# Patient Record
Sex: FEMALE | Race: WHITE | NOT HISPANIC OR LATINO | Employment: FULL TIME | URBAN - METROPOLITAN AREA
[De-identification: names, ages, dates, MRNs, and addresses within clinical notes are randomized per-mention and may not be internally consistent; named-entity substitution may affect disease eponyms.]

---

## 2023-01-23 PROBLEM — R19.8 TENESMUS: Status: ACTIVE | Noted: 2023-01-23

## 2023-01-23 PROBLEM — C18.7 MALIGNANT NEOPLASM OF SIGMOID COLON (MULTI): Status: ACTIVE | Noted: 2023-01-23

## 2023-01-23 PROBLEM — R10.9 ABDOMINAL CRAMPING: Status: ACTIVE | Noted: 2023-01-23

## 2023-01-23 PROBLEM — K62.5 BRBPR (BRIGHT RED BLOOD PER RECTUM): Status: ACTIVE | Noted: 2023-01-23

## 2023-01-23 PROBLEM — Z85.3 PERSONAL HISTORY OF BREAST CANCER: Status: ACTIVE | Noted: 2023-01-23

## 2023-01-23 PROBLEM — E04.1 THYROID NODULE: Status: ACTIVE | Noted: 2023-01-23

## 2023-01-23 PROBLEM — I25.10 CORONARY ARTERY DISEASE INVOLVING NATIVE CORONARY ARTERY OF NATIVE HEART WITHOUT ANGINA PECTORIS: Status: ACTIVE | Noted: 2023-01-23

## 2023-01-23 PROBLEM — K59.00 CONSTIPATION: Status: ACTIVE | Noted: 2023-01-23

## 2023-01-23 PROBLEM — N60.31: Status: ACTIVE | Noted: 2023-01-23

## 2023-01-23 PROBLEM — N64.89 POSTOPERATIVE BREAST ASYMMETRY: Status: ACTIVE | Noted: 2023-01-23

## 2023-01-23 PROBLEM — I82.A22: Status: ACTIVE | Noted: 2023-01-23

## 2023-01-23 PROBLEM — K21.9 GERD (GASTROESOPHAGEAL REFLUX DISEASE): Status: ACTIVE | Noted: 2023-01-23

## 2023-01-23 PROBLEM — R97.0 ELEVATED CEA: Status: ACTIVE | Noted: 2023-01-23

## 2023-01-23 RX ORDER — VIT C/E/ZN/COPPR/LUTEIN/ZEAXAN 250MG-90MG
CAPSULE ORAL
COMMUNITY
Start: 2022-05-23 | End: 2023-03-07 | Stop reason: SDUPTHER

## 2023-03-07 ENCOUNTER — OFFICE VISIT (OUTPATIENT)
Dept: PRIMARY CARE | Facility: CLINIC | Age: 63
End: 2023-03-07
Payer: COMMERCIAL

## 2023-03-07 VITALS
BODY MASS INDEX: 31.58 KG/M2 | DIASTOLIC BLOOD PRESSURE: 86 MMHG | SYSTOLIC BLOOD PRESSURE: 132 MMHG | HEART RATE: 69 BPM | TEMPERATURE: 97.3 F | WEIGHT: 185 LBS | RESPIRATION RATE: 16 BRPM | HEIGHT: 64 IN | OXYGEN SATURATION: 98 %

## 2023-03-07 DIAGNOSIS — E04.1 THYROID NODULE: ICD-10-CM

## 2023-03-07 DIAGNOSIS — K59.00 CONSTIPATION, UNSPECIFIED CONSTIPATION TYPE: ICD-10-CM

## 2023-03-07 DIAGNOSIS — Z13.220 LIPID SCREENING: ICD-10-CM

## 2023-03-07 DIAGNOSIS — C18.7 MALIGNANT NEOPLASM OF SIGMOID COLON (MULTI): ICD-10-CM

## 2023-03-07 DIAGNOSIS — I25.10 CORONARY ARTERY DISEASE INVOLVING NATIVE CORONARY ARTERY OF NATIVE HEART WITHOUT ANGINA PECTORIS: Primary | ICD-10-CM

## 2023-03-07 PROBLEM — I82.A22: Status: RESOLVED | Noted: 2023-01-23 | Resolved: 2023-03-07

## 2023-03-07 PROBLEM — R10.9 ABDOMINAL CRAMPING: Status: RESOLVED | Noted: 2023-01-23 | Resolved: 2023-03-07

## 2023-03-07 PROBLEM — E78.2 MIXED HYPERLIPIDEMIA: Status: ACTIVE | Noted: 2022-04-28

## 2023-03-07 PROBLEM — K21.9 GERD (GASTROESOPHAGEAL REFLUX DISEASE): Status: RESOLVED | Noted: 2023-01-23 | Resolved: 2023-03-07

## 2023-03-07 PROBLEM — R19.8 TENESMUS: Status: RESOLVED | Noted: 2023-01-23 | Resolved: 2023-03-07

## 2023-03-07 PROBLEM — K62.5 BRBPR (BRIGHT RED BLOOD PER RECTUM): Status: RESOLVED | Noted: 2023-01-23 | Resolved: 2023-03-07

## 2023-03-07 PROCEDURE — 1036F TOBACCO NON-USER: CPT | Performed by: FAMILY MEDICINE

## 2023-03-07 PROCEDURE — 99213 OFFICE O/P EST LOW 20 MIN: CPT | Performed by: FAMILY MEDICINE

## 2023-03-07 RX ORDER — CYANOCOBALAMIN (VITAMIN B-12) 500 MCG
1 TABLET ORAL DAILY
COMMUNITY

## 2023-03-07 RX ORDER — ALBUTEROL SULFATE 90 UG/1
AEROSOL, METERED RESPIRATORY (INHALATION)
COMMUNITY
End: 2023-03-07 | Stop reason: ALTCHOICE

## 2023-03-07 ASSESSMENT — ENCOUNTER SYMPTOMS
SINUS PRESSURE: 0
DYSPHORIC MOOD: 0
SLEEP DISTURBANCE: 0
DYSURIA: 0
APPETITE CHANGE: 0
NERVOUS/ANXIOUS: 0
ADENOPATHY: 0
WHEEZING: 0
LIGHT-HEADEDNESS: 0
CHILLS: 0
DIARRHEA: 0
DIFFICULTY URINATING: 0
NAUSEA: 0
HEADACHES: 0
ABDOMINAL PAIN: 0
FATIGUE: 0
SHORTNESS OF BREATH: 0
ABDOMINAL DISTENTION: 0
FEVER: 0
CHEST TIGHTNESS: 0
BRUISES/BLEEDS EASILY: 0

## 2023-03-07 NOTE — PROGRESS NOTES
"Subjective   Patient ID: Iwona Reardon is a 62 y.o. female who presents for New Patient Visit.  New pt to establish care. Was at [facility] 1 months ago with cellulitis R breast. Tx with Abx  now resolved. She has hx R breast cancer tx with lumpectomy about 17yrs. Also has hx rectal cancer post colectomy in 2022. Following with Dr Mulligan. ?hx CAD, seen on imaging. Thyroid nodule incidentally on PET scan was monitor and stable.         Review of Systems   Constitutional:  Negative for appetite change, chills, fatigue and fever.   HENT:  Negative for congestion, ear pain and sinus pressure.    Eyes:  Negative for visual disturbance.   Respiratory:  Negative for chest tightness, shortness of breath and wheezing.    Cardiovascular:  Negative for chest pain.   Gastrointestinal:  Negative for abdominal distention, abdominal pain, diarrhea and nausea.   Genitourinary:  Negative for difficulty urinating, dysuria and pelvic pain.   Allergic/Immunologic: Immunocompromised state: dx CVID about 1 year ago.   Neurological:  Negative for light-headedness and headaches.   Hematological:  Negative for adenopathy. Does not bruise/bleed easily.   Psychiatric/Behavioral:  Negative for dysphoric mood and sleep disturbance. The patient is not nervous/anxious.        Objective   /86   Pulse 69   Temp 36.3 °C (97.3 °F)   Resp 16   Ht 1.626 m (5' 4\")   Wt 83.9 kg (185 lb)   SpO2 98%   BMI 31.76 kg/m²    Physical Exam  Constitutional:       General: She is not in acute distress.     Appearance: Normal appearance. She is not ill-appearing.   HENT:      Head: Normocephalic and atraumatic.      Right Ear: Tympanic membrane, ear canal and external ear normal.      Left Ear: Tympanic membrane, ear canal and external ear normal.      Nose: Nose normal.      Mouth/Throat:      Mouth: Mucous membranes are moist.      Pharynx: No oropharyngeal exudate or posterior oropharyngeal erythema.   Eyes:      Extraocular Movements: Extraocular " movements intact.      Conjunctiva/sclera: Conjunctivae normal.      Pupils: Pupils are equal, round, and reactive to light.   Neck:      Vascular: No carotid bruit.   Cardiovascular:      Rate and Rhythm: Normal rate and regular rhythm.      Heart sounds: Normal heart sounds. No murmur heard.  Pulmonary:      Breath sounds: Normal breath sounds. No wheezing, rhonchi or rales.   Abdominal:      General: Bowel sounds are normal. There is no distension.      Palpations: Abdomen is soft. There is no mass.      Tenderness: There is no abdominal tenderness.   Musculoskeletal:         General: No swelling or deformity.      Cervical back: Neck supple. No tenderness.   Lymphadenopathy:      Cervical: No cervical adenopathy.   Skin:     General: Skin is warm and dry.      Findings: No lesion or rash.   Neurological:      Mental Status: She is alert and oriented to person, place, and time.      Sensory: No sensory deficit.      Motor: No weakness.      Coordination: Coordination normal.      Deep Tendon Reflexes: Reflexes normal.   Psychiatric:         Mood and Affect: Mood normal.         Behavior: Behavior normal.         Judgment: Judgment normal.           Assessment/Plan

## 2023-03-07 NOTE — PROGRESS NOTES
"Subjective   Patient ID: Iwona Reardon is a 62 y.o. female who presents for New Patient Visit.    HPI     Review of Systems    Objective   /86   Pulse 69   Temp 36.3 °C (97.3 °F)   Resp 16   Ht 1.626 m (5' 4\")   Wt 83.9 kg (185 lb)   SpO2 98%   BMI 31.76 kg/m²     Physical Exam    Assessment/Plan          "

## 2023-06-16 ENCOUNTER — LAB (OUTPATIENT)
Dept: LAB | Facility: LAB | Age: 63
End: 2023-06-16
Payer: COMMERCIAL

## 2023-06-16 DIAGNOSIS — E04.1 THYROID NODULE: Primary | ICD-10-CM

## 2023-06-16 DIAGNOSIS — E78.2 MIXED HYPERLIPIDEMIA: ICD-10-CM

## 2023-06-16 DIAGNOSIS — Z13.89 SCREENING FOR BLOOD OR PROTEIN IN URINE: ICD-10-CM

## 2023-06-16 DIAGNOSIS — E04.1 THYROID NODULE: ICD-10-CM

## 2023-06-16 LAB
APPEARANCE, URINE: ABNORMAL
BACTERIA, URINE: ABNORMAL /HPF
BILIRUBIN, URINE: NEGATIVE
BLOOD, URINE: NEGATIVE
CALCIUM OXALATE CRYSTALS, URINE: ABNORMAL /HPF
CHOLESTEROL (MG/DL) IN SER/PLAS: 205 MG/DL (ref 0–199)
CHOLESTEROL IN HDL (MG/DL) IN SER/PLAS: 42.5 MG/DL
CHOLESTEROL/HDL RATIO: 4.8
COLOR, URINE: YELLOW
GLUCOSE, URINE: NEGATIVE MG/DL
HYALINE CASTS, URINE: ABNORMAL /LPF
KETONES, URINE: NEGATIVE MG/DL
LDL: 128 MG/DL (ref 0–99)
LEUKOCYTE ESTERASE, URINE: ABNORMAL
MUCUS, URINE: ABNORMAL /LPF
NITRITE, URINE: NEGATIVE
PH, URINE: 5 (ref 5–8)
PROTEIN, URINE: NEGATIVE MG/DL
RBC, URINE: 1 /HPF (ref 0–5)
SPECIFIC GRAVITY, URINE: 1.02 (ref 1–1.03)
SQUAMOUS EPITHELIAL CELLS, URINE: 11 /HPF
THYROTROPIN (MIU/L) IN SER/PLAS BY DETECTION LIMIT <= 0.05 MIU/L: 2.77 MIU/L (ref 0.44–3.98)
TRIGLYCERIDE (MG/DL) IN SER/PLAS: 173 MG/DL (ref 0–149)
UROBILINOGEN, URINE: <2 MG/DL (ref 0–1.9)
VLDL: 35 MG/DL (ref 0–40)
WBC, URINE: 2 /HPF (ref 0–5)

## 2023-06-16 PROCEDURE — 80061 LIPID PANEL: CPT

## 2023-06-16 PROCEDURE — 84443 ASSAY THYROID STIM HORMONE: CPT

## 2023-06-16 PROCEDURE — 81001 URINALYSIS AUTO W/SCOPE: CPT

## 2023-06-16 PROCEDURE — 36415 COLL VENOUS BLD VENIPUNCTURE: CPT

## 2023-06-19 ENCOUNTER — OFFICE VISIT (OUTPATIENT)
Dept: PRIMARY CARE | Facility: CLINIC | Age: 63
End: 2023-06-19
Payer: COMMERCIAL

## 2023-06-19 VITALS
HEART RATE: 80 BPM | HEIGHT: 64 IN | RESPIRATION RATE: 16 BRPM | DIASTOLIC BLOOD PRESSURE: 76 MMHG | WEIGHT: 187 LBS | BODY MASS INDEX: 31.92 KG/M2 | OXYGEN SATURATION: 96 % | SYSTOLIC BLOOD PRESSURE: 124 MMHG

## 2023-06-19 DIAGNOSIS — C20 RECTAL CANCER (MULTI): ICD-10-CM

## 2023-06-19 DIAGNOSIS — B00.1 RECURRENT COLD SORES: ICD-10-CM

## 2023-06-19 DIAGNOSIS — Z85.3 PERSONAL HISTORY OF BREAST CANCER: ICD-10-CM

## 2023-06-19 DIAGNOSIS — E78.2 MIXED HYPERLIPIDEMIA: ICD-10-CM

## 2023-06-19 DIAGNOSIS — E04.1 THYROID NODULE: ICD-10-CM

## 2023-06-19 DIAGNOSIS — E66.09 CLASS 1 OBESITY DUE TO EXCESS CALORIES WITHOUT SERIOUS COMORBIDITY WITH BODY MASS INDEX (BMI) OF 31.0 TO 31.9 IN ADULT: ICD-10-CM

## 2023-06-19 DIAGNOSIS — Z00.00 HEALTHCARE MAINTENANCE: Primary | ICD-10-CM

## 2023-06-19 PROBLEM — M46.20 PARASPINAL ABSCESS (MULTI): Status: ACTIVE | Noted: 2022-11-22

## 2023-06-19 PROBLEM — C18.7 MALIGNANT NEOPLASM OF SIGMOID COLON (MULTI): Status: RESOLVED | Noted: 2023-01-23 | Resolved: 2023-06-19

## 2023-06-19 PROBLEM — B95.61 MSSA BACTEREMIA: Status: RESOLVED | Noted: 2022-11-22 | Resolved: 2023-06-19

## 2023-06-19 PROBLEM — I25.10 CORONARY ARTERY DISEASE INVOLVING NATIVE CORONARY ARTERY OF NATIVE HEART WITHOUT ANGINA PECTORIS: Status: RESOLVED | Noted: 2023-01-23 | Resolved: 2023-06-19

## 2023-06-19 PROBLEM — M46.20 PARASPINAL ABSCESS (MULTI): Status: RESOLVED | Noted: 2022-11-22 | Resolved: 2023-06-19

## 2023-06-19 PROBLEM — R78.81 MSSA BACTEREMIA: Status: ACTIVE | Noted: 2022-11-22

## 2023-06-19 PROBLEM — B95.61 MSSA BACTEREMIA: Status: ACTIVE | Noted: 2022-11-22

## 2023-06-19 PROBLEM — E66.811 CLASS 1 OBESITY DUE TO EXCESS CALORIES WITHOUT SERIOUS COMORBIDITY WITH BODY MASS INDEX (BMI) OF 31.0 TO 31.9 IN ADULT: Status: ACTIVE | Noted: 2023-06-19

## 2023-06-19 PROBLEM — R78.81 MSSA BACTEREMIA: Status: RESOLVED | Noted: 2022-11-22 | Resolved: 2023-06-19

## 2023-06-19 PROCEDURE — 90472 IMMUNIZATION ADMIN EACH ADD: CPT | Performed by: FAMILY MEDICINE

## 2023-06-19 PROCEDURE — 90715 TDAP VACCINE 7 YRS/> IM: CPT | Performed by: FAMILY MEDICINE

## 2023-06-19 PROCEDURE — 3008F BODY MASS INDEX DOCD: CPT | Performed by: FAMILY MEDICINE

## 2023-06-19 PROCEDURE — 1036F TOBACCO NON-USER: CPT | Performed by: FAMILY MEDICINE

## 2023-06-19 PROCEDURE — 99396 PREV VISIT EST AGE 40-64: CPT | Performed by: FAMILY MEDICINE

## 2023-06-19 PROCEDURE — 90707 MMR VACCINE SC: CPT | Performed by: FAMILY MEDICINE

## 2023-06-19 PROCEDURE — 90471 IMMUNIZATION ADMIN: CPT | Performed by: FAMILY MEDICINE

## 2023-06-19 RX ORDER — VALACYCLOVIR HYDROCHLORIDE 1 G/1
TABLET, FILM COATED ORAL
Qty: 16 TABLET | Refills: 1 | Status: SHIPPED | OUTPATIENT
Start: 2023-06-19 | End: 2023-10-24 | Stop reason: HOSPADM

## 2023-06-19 ASSESSMENT — ENCOUNTER SYMPTOMS
NAUSEA: 0
NERVOUS/ANXIOUS: 0
DYSURIA: 0
ABDOMINAL PAIN: 0
SHORTNESS OF BREATH: 0
SINUS PRESSURE: 0
BRUISES/BLEEDS EASILY: 0
ADENOPATHY: 0
MYALGIAS: 0
LIGHT-HEADEDNESS: 0
DYSPHORIC MOOD: 0
APPETITE CHANGE: 0
HEADACHES: 0
ARTHRALGIAS: 0
DIARRHEA: 0
DIFFICULTY URINATING: 0
SLEEP DISTURBANCE: 0
FEVER: 0
CHEST TIGHTNESS: 0
WHEEZING: 0
CHILLS: 0
FATIGUE: 0
ABDOMINAL DISTENTION: 0

## 2023-06-19 NOTE — PROGRESS NOTES
"Subjective   Patient ID: Iwona Reardon is a 62 y.o. female who presents for Annual Exam.  PMHX, PSHx, Fam hx, and Social hx reviewed.   New concerns - doing well with hx rectal cancer, monitoring with Dr Mulligan almost 1year from resection.  Also has hx breast cancer and is monitoring with Gyn.   Vaccines TDAP/Measles  Dentist seen at least yearly   Vision concerns none  Hearing concerns none  Diet is usually overall healthy.   Smoker - no  Alcohol use - 0-2 drinks/week  Exercising 0 days per week.  Mammogram current  Last PAP  was before hysterectomy        Review of Systems   Constitutional:  Negative for appetite change, chills, fatigue and fever.   HENT:  Negative for congestion, ear pain and sinus pressure.    Eyes:  Negative for visual disturbance.   Respiratory:  Negative for chest tightness, shortness of breath and wheezing.    Cardiovascular:  Negative for chest pain.   Gastrointestinal:  Negative for abdominal distention, abdominal pain, diarrhea and nausea.   Genitourinary:  Negative for difficulty urinating, dysuria and pelvic pain.   Musculoskeletal:  Negative for arthralgias and myalgias.   Skin:  Positive for rash (has redness, cracking and pain around lips that come and go for years.).   Allergic/Immunologic: Negative for immunocompromised state.   Neurological:  Negative for light-headedness and headaches.   Hematological:  Negative for adenopathy. Does not bruise/bleed easily.   Psychiatric/Behavioral:  Negative for dysphoric mood and sleep disturbance. The patient is not nervous/anxious.        Objective   /76   Pulse 80   Resp 16   Ht 1.626 m (5' 4\")   Wt 84.8 kg (187 lb)   SpO2 96%   BMI 32.10 kg/m²    Physical Exam  Constitutional:       General: She is not in acute distress.     Appearance: Normal appearance. She is not ill-appearing.   HENT:      Head: Normocephalic and atraumatic.      Right Ear: Tympanic membrane, ear canal and external ear normal.      Left Ear: Tympanic " membrane, ear canal and external ear normal.      Nose: Nose normal.      Mouth/Throat:      Mouth: Mucous membranes are moist.      Pharynx: No oropharyngeal exudate or posterior oropharyngeal erythema.   Eyes:      Extraocular Movements: Extraocular movements intact.      Conjunctiva/sclera: Conjunctivae normal.      Pupils: Pupils are equal, round, and reactive to light.   Neck:      Vascular: No carotid bruit.   Cardiovascular:      Rate and Rhythm: Normal rate and regular rhythm.      Heart sounds: Normal heart sounds. No murmur heard.  Pulmonary:      Breath sounds: Normal breath sounds. No wheezing, rhonchi or rales.   Abdominal:      General: Bowel sounds are normal. There is no distension.      Palpations: Abdomen is soft. There is no mass.      Tenderness: There is no abdominal tenderness.   Musculoskeletal:         General: No swelling or deformity.      Cervical back: Neck supple. No tenderness.   Lymphadenopathy:      Cervical: No cervical adenopathy.   Skin:     General: Skin is warm and dry.      Findings: No lesion or rash.   Neurological:      Mental Status: She is alert and oriented to person, place, and time.      Sensory: No sensory deficit.      Motor: No weakness.      Coordination: Coordination normal.      Deep Tendon Reflexes: Reflexes normal.   Psychiatric:         Mood and Affect: Mood normal.         Behavior: Behavior normal.         Judgment: Judgment normal.           Assessment/Plan   Diagnoses and all orders for this visit:  Healthcare maintenance - MMR and TDAP shots given. Shingles vaccines recommended at future visit. Labs reviewed and discussed. Colon and Breast screening current, per oncology/gyn.  Mixed hyperlipidemia - LDL and TG mildly elevated, continue to monitor  Thyroid nodule - monitoring with Dr Palma  Personal history of breast cancer  Recurrent cold sores  -     valACYclovir (Valtrex) 1 gram tablet; Take 2000mg twice daily x 1 day for flare up of cold sore  Weight  - recommend low carb diet, increasing water intake to at least 64oz/day, healthy snacking between meals, and regular cardiovascular exercise 150mins/week. Goal for weight loss is 1-2# per week.    Follow up in 1year, 30min for physical

## 2023-06-19 NOTE — PROGRESS NOTES
"Subjective   Patient ID: Iwona Reardon is a 62 y.o. female who presents for Annual Exam.    HPI     Review of Systems    Objective   /76   Pulse 80   Resp 16   Ht 1.626 m (5' 4\")   Wt 84.8 kg (187 lb)   SpO2 96%   BMI 32.10 kg/m²     Physical Exam    Assessment/Plan          "

## 2023-07-13 ENCOUNTER — HOSPITAL ENCOUNTER (OUTPATIENT)
Dept: DATA CONVERSION | Facility: HOSPITAL | Age: 63
End: 2023-07-13
Attending: COLON & RECTAL SURGERY
Payer: COMMERCIAL

## 2023-07-13 DIAGNOSIS — C18.7 MALIGNANT NEOPLASM OF SIGMOID COLON (MULTI): ICD-10-CM

## 2023-07-13 DIAGNOSIS — Z12.11 ENCOUNTER FOR SCREENING FOR MALIGNANT NEOPLASM OF COLON: ICD-10-CM

## 2023-07-13 DIAGNOSIS — Z98.0 INTESTINAL BYPASS AND ANASTOMOSIS STATUS: ICD-10-CM

## 2023-07-13 DIAGNOSIS — Z88.0 ALLERGY STATUS TO PENICILLIN: ICD-10-CM

## 2023-07-13 DIAGNOSIS — Z85.048 PERSONAL HISTORY OF OTHER MALIGNANT NEOPLASM OF RECTUM, RECTOSIGMOID JUNCTION, AND ANUS: ICD-10-CM

## 2023-07-13 DIAGNOSIS — Z91.040 LATEX ALLERGY STATUS: ICD-10-CM

## 2023-07-13 DIAGNOSIS — Z85.850 PERSONAL HISTORY OF MALIGNANT NEOPLASM OF THYROID: ICD-10-CM

## 2023-07-13 DIAGNOSIS — Z87.891 PERSONAL HISTORY OF NICOTINE DEPENDENCE: ICD-10-CM

## 2023-07-13 DIAGNOSIS — Z85.3 PERSONAL HISTORY OF MALIGNANT NEOPLASM OF BREAST: ICD-10-CM

## 2023-08-07 LAB
ALANINE AMINOTRANSFERASE (SGPT) (U/L) IN SER/PLAS: 18 U/L (ref 7–45)
ALBUMIN (G/DL) IN SER/PLAS: 4.2 G/DL (ref 3.4–5)
ALKALINE PHOSPHATASE (U/L) IN SER/PLAS: 78 U/L (ref 33–136)
ANION GAP IN SER/PLAS: 12 MMOL/L (ref 10–20)
ASPARTATE AMINOTRANSFERASE (SGOT) (U/L) IN SER/PLAS: 17 U/L (ref 9–39)
BILIRUBIN TOTAL (MG/DL) IN SER/PLAS: 0.5 MG/DL (ref 0–1.2)
CALCIUM (MG/DL) IN SER/PLAS: 9.9 MG/DL (ref 8.6–10.6)
CARBON DIOXIDE, TOTAL (MMOL/L) IN SER/PLAS: 29 MMOL/L (ref 21–32)
CHLORIDE (MMOL/L) IN SER/PLAS: 107 MMOL/L (ref 98–107)
CREATININE (MG/DL) IN SER/PLAS: 0.85 MG/DL (ref 0.5–1.05)
GFR FEMALE: 77 ML/MIN/1.73M2
GLUCOSE (MG/DL) IN SER/PLAS: 116 MG/DL (ref 74–99)
POTASSIUM (MMOL/L) IN SER/PLAS: 4.7 MMOL/L (ref 3.5–5.3)
PROTEIN TOTAL: 7 G/DL (ref 6.4–8.2)
SODIUM (MMOL/L) IN SER/PLAS: 143 MMOL/L (ref 136–145)
UREA NITROGEN (MG/DL) IN SER/PLAS: 23 MG/DL (ref 6–23)

## 2023-08-22 LAB
ALANINE AMINOTRANSFERASE (SGPT) (U/L) IN SER/PLAS: 16 U/L (ref 7–45)
ALBUMIN (G/DL) IN SER/PLAS: 4.5 G/DL (ref 3.4–5)
ALKALINE PHOSPHATASE (U/L) IN SER/PLAS: 73 U/L (ref 33–136)
ANION GAP IN SER/PLAS: 15 MMOL/L (ref 10–20)
ASPARTATE AMINOTRANSFERASE (SGOT) (U/L) IN SER/PLAS: 14 U/L (ref 9–39)
BASOPHILS (10*3/UL) IN BLOOD BY AUTOMATED COUNT: 0.04 X10E9/L (ref 0–0.1)
BASOPHILS/100 LEUKOCYTES IN BLOOD BY AUTOMATED COUNT: 0.5 % (ref 0–2)
BILIRUBIN TOTAL (MG/DL) IN SER/PLAS: 0.4 MG/DL (ref 0–1.2)
CALCIUM (MG/DL) IN SER/PLAS: 9.5 MG/DL (ref 8.6–10.6)
CARBON DIOXIDE, TOTAL (MMOL/L) IN SER/PLAS: 24 MMOL/L (ref 21–32)
CARCINOEMBRYONIC AG (NG/ML) IN SER/PLAS: 1.6 UG/L
CHLORIDE (MMOL/L) IN SER/PLAS: 108 MMOL/L (ref 98–107)
CREATININE (MG/DL) IN SER/PLAS: 0.74 MG/DL (ref 0.5–1.05)
EOSINOPHILS (10*3/UL) IN BLOOD BY AUTOMATED COUNT: 0.08 X10E9/L (ref 0–0.7)
EOSINOPHILS/100 LEUKOCYTES IN BLOOD BY AUTOMATED COUNT: 1 % (ref 0–6)
ERYTHROCYTE DISTRIBUTION WIDTH (RATIO) BY AUTOMATED COUNT: 13 % (ref 11.5–14.5)
ERYTHROCYTE MEAN CORPUSCULAR HEMOGLOBIN CONCENTRATION (G/DL) BY AUTOMATED: 33.1 G/DL (ref 32–36)
ERYTHROCYTE MEAN CORPUSCULAR VOLUME (FL) BY AUTOMATED COUNT: 94 FL (ref 80–100)
ERYTHROCYTES (10*6/UL) IN BLOOD BY AUTOMATED COUNT: 4.66 X10E12/L (ref 4–5.2)
GFR FEMALE: >90 ML/MIN/1.73M2
GLUCOSE (MG/DL) IN SER/PLAS: 102 MG/DL (ref 74–99)
HEMATOCRIT (%) IN BLOOD BY AUTOMATED COUNT: 43.8 % (ref 36–46)
HEMOGLOBIN (G/DL) IN BLOOD: 14.5 G/DL (ref 12–16)
IMMATURE GRANULOCYTES/100 LEUKOCYTES IN BLOOD BY AUTOMATED COUNT: 0.2 % (ref 0–0.9)
LEUKOCYTES (10*3/UL) IN BLOOD BY AUTOMATED COUNT: 8.1 X10E9/L (ref 4.4–11.3)
LYMPHOCYTES (10*3/UL) IN BLOOD BY AUTOMATED COUNT: 2.37 X10E9/L (ref 1.2–4.8)
LYMPHOCYTES/100 LEUKOCYTES IN BLOOD BY AUTOMATED COUNT: 29.3 % (ref 13–44)
MONOCYTES (10*3/UL) IN BLOOD BY AUTOMATED COUNT: 0.53 X10E9/L (ref 0.1–1)
MONOCYTES/100 LEUKOCYTES IN BLOOD BY AUTOMATED COUNT: 6.6 % (ref 2–10)
NEUTROPHILS (10*3/UL) IN BLOOD BY AUTOMATED COUNT: 5.04 X10E9/L (ref 1.2–7.7)
NEUTROPHILS/100 LEUKOCYTES IN BLOOD BY AUTOMATED COUNT: 62.4 % (ref 40–80)
NRBC (PER 100 WBCS) BY AUTOMATED COUNT: 0 /100 WBC (ref 0–0)
PLATELETS (10*3/UL) IN BLOOD AUTOMATED COUNT: 274 X10E9/L (ref 150–450)
POTASSIUM (MMOL/L) IN SER/PLAS: 4.2 MMOL/L (ref 3.5–5.3)
PROTEIN TOTAL: 6.7 G/DL (ref 6.4–8.2)
SODIUM (MMOL/L) IN SER/PLAS: 143 MMOL/L (ref 136–145)
UREA NITROGEN (MG/DL) IN SER/PLAS: 18 MG/DL (ref 6–23)

## 2023-10-10 ENCOUNTER — DOCUMENTATION (OUTPATIENT)
Dept: PREADMISSION TESTING | Facility: HOSPITAL | Age: 63
End: 2023-10-10
Payer: COMMERCIAL

## 2023-10-10 RX ORDER — DEXTROMETHORPHAN HYDROBROMIDE, GUAIFENESIN 5; 100 MG/5ML; MG/5ML
650 LIQUID ORAL EVERY 8 HOURS PRN
COMMUNITY
End: 2023-10-24 | Stop reason: HOSPADM

## 2023-10-10 RX ORDER — DIPHENHYDRAMINE HCL 50 MG
50 CAPSULE ORAL EVERY 6 HOURS PRN
COMMUNITY
End: 2023-10-24 | Stop reason: HOSPADM

## 2023-10-16 ENCOUNTER — PRE-ADMISSION TESTING (OUTPATIENT)
Dept: PREADMISSION TESTING | Facility: HOSPITAL | Age: 63
End: 2023-10-16
Payer: COMMERCIAL

## 2023-10-16 ENCOUNTER — LAB (OUTPATIENT)
Dept: LAB | Facility: LAB | Age: 63
End: 2023-10-16
Payer: COMMERCIAL

## 2023-10-16 VITALS
DIASTOLIC BLOOD PRESSURE: 73 MMHG | TEMPERATURE: 97.7 F | SYSTOLIC BLOOD PRESSURE: 126 MMHG | RESPIRATION RATE: 18 BRPM | HEART RATE: 57 BPM | HEIGHT: 64 IN | WEIGHT: 183.42 LBS | OXYGEN SATURATION: 99 % | BODY MASS INDEX: 31.31 KG/M2

## 2023-10-16 DIAGNOSIS — Z01.818 PREOP TESTING: ICD-10-CM

## 2023-10-16 DIAGNOSIS — Z01.810 PREOP CARDIOVASCULAR EXAM: Primary | ICD-10-CM

## 2023-10-16 DIAGNOSIS — I25.10 CORONARY ARTERY DISEASE INVOLVING NATIVE HEART, UNSPECIFIED VESSEL OR LESION TYPE, UNSPECIFIED WHETHER ANGINA PRESENT: ICD-10-CM

## 2023-10-16 LAB
ABO GROUP (TYPE) IN BLOOD: NORMAL
ANTIBODY SCREEN: NORMAL
RH FACTOR (ANTIGEN D): NORMAL

## 2023-10-16 PROCEDURE — 86900 BLOOD TYPING SEROLOGIC ABO: CPT

## 2023-10-16 PROCEDURE — 86901 BLOOD TYPING SEROLOGIC RH(D): CPT

## 2023-10-16 PROCEDURE — 93005 ELECTROCARDIOGRAM TRACING: CPT | Performed by: PHYSICIAN ASSISTANT

## 2023-10-16 PROCEDURE — 99214 OFFICE O/P EST MOD 30 MIN: CPT | Performed by: PHYSICIAN ASSISTANT

## 2023-10-16 PROCEDURE — 86850 RBC ANTIBODY SCREEN: CPT

## 2023-10-16 PROCEDURE — 36415 COLL VENOUS BLD VENIPUNCTURE: CPT

## 2023-10-16 ASSESSMENT — ENCOUNTER SYMPTOMS
NECK PAIN: 0
APPETITE CHANGE: 0
APNEA: 0
DYSURIA: 0
ARTHRALGIAS: 0
HEADACHES: 0
DIARRHEA: 0
CONSTIPATION: 0
FEVER: 0
COUGH: 0
SHORTNESS OF BREATH: 0
AGITATION: 0
DIZZINESS: 0
TROUBLE SWALLOWING: 0
CONFUSION: 0
ABDOMINAL DISTENTION: 0
BACK PAIN: 0
CHILLS: 0
EYE DISCHARGE: 0
VOMITING: 0
EYE PAIN: 0
BRUISES/BLEEDS EASILY: 0
SORE THROAT: 0
HEMATURIA: 0
DIFFICULTY URINATING: 0
SEIZURES: 0
ABDOMINAL PAIN: 0
NECK STIFFNESS: 0

## 2023-10-16 NOTE — PREPROCEDURE INSTRUCTIONS
Medication List            Accurate as of October 16, 2023  8:20 AM. Always use your most recent med list.                acetaminophen 650 mg ER tablet  Commonly known as: Tylenol 8 HOUR  Medication Adjustments for Surgery: Take morning of surgery with sip of water, no other fluids     cholecalciferol 10 MCG (400 UNIT) tablet  Commonly known as: Vitamin D-3  Medication Adjustments for Surgery: Continue until night before surgery     diphenhydrAMINE 50 mg capsule  Commonly known as: BENADryl  Medication Adjustments for Surgery: Continue until night before surgery     valACYclovir 1 gram tablet  Commonly known as: Valtrex  Take 2000mg twice daily x 1 day for flare up of cold sore  Medication Adjustments for Surgery: Take morning of surgery with sip of water, no other fluids                        PAT DISCHARGE INSTRUCTIONS    Pre-surgery COVID-19 testing: We want to perform your surgery in the safest possible way to give you the best chance of a smooth recovery. One of our healthcare members will reach out to you 5-7 days prior to your procedure/surgery to schedule you for COVID testing. This testing must be done 2-3 days before your procedure/surgery even if you do not show symptoms consistent with the virus.   Self-Quarantine -Avoid contact with others or leaving the home except for a doctor’s appointment AFTER your COVID test.   Check for symptoms daily prior to surgery and notify us if you have any of the following    Fever = 38.0 C (100.4 F)     New respiratory symptoms (e.g. cough, shortness of breath, respiratory distress, sore throat)   Recent loss of taste or smell   Flu like symptoms such as headache, fatigue or gastrointestinal symptoms      Please let your surgeon know if:      You develop any open sores, shingles, burning or painful urination as these may increase your risk of an infection.   You no longer wish to have the surgery.   Any other personal circumstances change that may lead to the need to  cancel or defer this surgery-such as being sick or getting admitted to any hospital within one week of your planned procedure.    Your contact details change, such as a change of address or phone number.    Starting now:     Stop smoking. It is well known that quitting smoking can make a huge difference to your health and recovery from surgery. The longer you abstain from smoking, the better your chances of a healthy recovery. If you need help with quitting, call 8-026-QUIT-NOW to be connected to a trained counselor who will discuss the best methods to help you quit.       One week before your surgery:     Please stop all supplements 7 days prior to surgery. Vitamins A, C and E must be stopped for 7 days but Vitamins B and D may be continued till the day before surgery.    Please stop taking NSAID pain medicine such as Advil and Motrin 7 days before surgery.    If you develop any fever, cough, cold, rashes, cuts, scratches, scrapes, urinary symptoms or infection anywhere on your body (including teeth and gums) prior to surgery, please call your surgeon’s office as soon as possible. This may require treatment to reduce the chance of cancellation on the day of surgery.    The day before your surgery:     DIET- Do not eat any solid food after midnight the night before surgery. Clear liquids (water, tea, black coffee and apple juice) are acceptable up to 2 hours before your surgery.    Get a good night’s rest. Use the special soap for bathing if you have been instructed to use one.    Arrival time is typically 2 hours prior to the time of surgery. The Pre-operative nursing team will call between the hours of 2 p.m. and 6 p.m. the business day before your surgery to provide you with your arrival time. If you have not received a phone call by 6 p.m., please call [phone] for assistance.    Scheduled surgery times may change and you will be notified if this occurs - please check your personal voicemail for any updates.     In the event of an illness or the need to cancel your surgery - Please notify your surgeon and/or [facility] operative services [phone].    On the morning of surgery:   Wear comfortable, loose fitting clothes which open in the front. Please do not wear moisturizers, creams, lotions or perfume.    Please bring with you to surgery:   Photo ID and insurance card   Current list of medicines and allergies   Pacemaker/ Defibrillator/Heart stent cards   CPAP machine and mask    Slings/ splints/ crutches   A copy of your complete advanced directive/DHPOA.    Please do NOT bring with you to surgery:   All jewelry and valuables should be left at home.   Prosthetic devices such as contact lenses, hearing aids, dentures, eyelash extensions, hairpins and body piercings must be removed prior to going in to the surgical suite.    Parking and where to go when you arrive:      Free  parking is available in the front of the building for all patients scheduled for surgery. Please check in at the desk just behind the main entrance and let them know you are here for surgery and you will be directed to the 2nd floor operating suite.    After outpatient surgery:   A responsible adult MUST accompany you at the time of discharge and stay with you for 24 hours after your surgery. You may NOT drive yourself home after surgery.    Do not drive, operate machinery, make critical decisions or do activities that require co-ordination or balance until after a night’s sleep.   Do not drink alcoholic beverages for 24 hours.   Instructions for resuming your medications will be provided by your surgeon.      CALL YOUR DOCTOR AFTER SURGERY IF YOU HAVE:     Chills and/or a fever of 101° F or higher.    Redness, swelling, pus or drainage from your surgical wound or a bad smell from the wound.    Lightheadedness, fainting or confusion.    Persistent vomiting (throwing up) and are not able to eat or drink for 12 hours.    Three or  more loose, watery bowel movements in 24 hours (diarrhea).   Difficulty or pain while urinating( after non-urological surgery)    Pain and swelling in your legs, especially if it is only on one side.    Difficulty breathing or are breathing faster than normal.    Any new concerning symptoms.

## 2023-10-16 NOTE — CPM/PAT H&P
[facility] Evaluation       Name: Iwona Reardon (Iwona Reardon)  /Age: 1960/63 y.o.         Date of Consult: 10/16/23    Referring Provider: Dr. Ignacio    Surgery, Date, and Length: left thyroidectomy, 10/24/23, 2.5HRS    Iwona Reardon is a 63 year-old female who presents to the [facility] for perioperative risk assessment prior to surgery.    Patient presents with a primary diagnosis of malignant neoplasm of thyroid gland. Pt with history of both colon cancer () and breast cancer (). As follow-up from her colon cancer she had a PET scan done last year showing an increased area of uptake in the left thyroid lobe. This corresponds to a 1.3 cm left-sided thyroid nodule with some atypical features, TI-RADS 5. She had a follow-up ultrasound done this year. This was about 9 months after the original. There is been no growth or change in the nodule but it does remain TI-RADS 5. Pt denies any changes to her voice or difficulty swallowing.  No palpable nodule on exam.     Also concerning for her is a family history of first-degree relative and her sister who had thyroid cancer. Finally, she did have breast cancer in  and did receive some radiation for that.    This note was created in part upon personal review of patient's medical records.      Patient is scheduled to have left thyroidectomy      Pt denies any past history of anesthetic complications such as PONV, awareness, prolonged sedation, dental damage, aspiration, cardiac arrest, difficult intubation, difficult I.V. access or unexpected hospital admissions.  NO malignant hyperthermia and or pseudocholinesterase deficiency.  No history of blood transfusions     The patient is not a [employer] and will accept blood and blood products if medically indicated.   Type and screen sent.    Patient Active Problem List   Diagnosis    Constipation    Elevated CEA    Fibrosclerosis of right breast    Postoperative breast asymmetry     Personal history of breast cancer    Thyroid nodule    Mixed hyperlipidemia    Healthcare maintenance    Rectal cancer (CMS/HCC)    Recurrent cold sores    Class 1 obesity due to excess calories without serious comorbidity with body mass index (BMI) of 31.0 to 31.9 in adult        Past Medical History:   Diagnosis Date    Benign endometrial hyperplasia 10/06/2014    Complex endometrial hyperplasia    BRBPR (bright red blood per rectum) 01/23/2023    Breast cancer (CMS/HCC)     Chronic deep vein thrombosis (DVT) of axillary vein of left upper extremity (CMS/HCC) 01/23/2023    eliquis stopped 1/2023    Colon cancer (CMS/[facility])     GERD (gastroesophageal reflux disease) 01/23/2023    Infection following a procedure, other surgical site, initial encounter 12/04/2014    Wound infection after surgery    MSSA bacteremia 11/22/2022    Other specified diseases of intestine 07/29/2022    Mass of colon    Paraspinal abscess (CMS/HCC) 11/22/2022    Personal history of other benign neoplasm 11/21/2014    History of uterine leiomyoma    Personal history of other diseases of the digestive system 11/18/2013    History of esophageal reflux    Personal history of other infectious and parasitic diseases 05/02/2017    History of herpes zoster    Personal history of other infectious and parasitic diseases 05/02/2017    History of herpes zoster    Postmenopausal bleeding 08/29/2014    Postmenopausal bleeding    Rash and other nonspecific skin eruption 06/29/2017    Skin rash       Past Surgical History:   Procedure Laterality Date    BREAST SURGERY      COLON SURGERY      COLONOSCOPY  06/2023    DILATION AND CURETTAGE OF UTERUS  08/29/2014    Dilation And Curettage    LYMPH NODE BIOPSY      MASTECTOMY, PARTIAL  11/18/2013    Right Breast Partial Mastectomy    OTHER SURGICAL HISTORY  11/18/2013    Axillary Lymphadenectomy - Complete    OTHER SURGICAL HISTORY  04/14/2014    Hysteroscopy    OTHER SURGICAL HISTORY  07/29/2022    Laparoscopic  sigmoidectomy    OTHER SURGICAL HISTORY  2022    Low anterior resection    OTHER SURGICAL HISTORY  2022    Sigmoidoscopy flexible    SENTINEL LYMPH NODE BIOPSY  2013    [place] Lymph Node Biopsy    TOTAL ABDOMINAL HYSTERECTOMY W/ BILATERAL SALPINGOOPHORECTOMY  2014    Total Abdominal Hysterectomy With Removal Of Both Ovaries       Patient  reports being sexually active and has had partner(s) who are male. She reports using the following method of birth control/protection: None.    Family History   Problem Relation Name Age of Onset    Other (skin conditions) Mother      Thyroid cancer Sister      Breast cancer Mother's Sister      Lung cancer Mother's Sister      Other (metastasis to the brain) Mother's Sister      Uterine cancer Mother's Sister      Breast cancer Father's Sister      Pancreatic cancer Maternal Grandfather      Cancer Father Everardo Truong      Social History     Socioeconomic History    Marital status:      Spouse name: Not on file    Number of children: Not on file    Years of education: Not on file    Highest education level: Not on file   Occupational History    Not on file   Tobacco Use    Smoking status: Former     Packs/day: 1.00     Years: 35.00     Additional pack years: 0.00     Total pack years: 35.00     Types: Cigarettes     Quit date: 2006     Years since quittin.8    Smokeless tobacco: Never   Substance and Sexual Activity    Alcohol use: Yes     Alcohol/week: 1.0 standard drink of alcohol     Types: 1 Standard drinks or equivalent per week     Comment: drinks socially on holidays    Drug use: Never    Sexual activity: Yes     Partners: Male     Birth control/protection: None   Other Topics Concern    Not on file   Social History Narrative    Not on file     Social Determinants of Health     Financial Resource Strain: Not on file   Food Insecurity: Not on file   Transportation Needs: Not on file   Physical Activity: Not on file   Stress:  Not on file   Social Connections: Not on file   Intimate Partner Violence: Not on file   Housing Stability: Not on file        Allergies   Allergen Reactions    Latex Unknown    Penicillins Hives and Unknown    Vancomycin Unknown     Occurred during infusion on 2/6/23 and rash persisted afterwards in both arms.       Prior to Admission medications    Medication Sig Start Date End Date Taking? Authorizing Provider   acetaminophen (Tylenol 8 HOUR) 650 mg ER tablet Take 1 tablet (650 mg) by mouth every 8 hours if needed for mild pain (1 - 3). Do not crush, chew, or split.    Historical Provider, MD   cholecalciferol (Vitamin D-3) 10 MCG (400 UNIT) tablet Take 1 tablet (10 mcg) by mouth once daily.    Historical Provider, MD   diphenhydrAMINE (BENADryl) 50 mg capsule Take 1 capsule (50 mg) by mouth every 6 hours if needed for itching.    Historical Provider, MD   valACYclovir (Valtrex) 1 gram tablet Take 2000mg twice daily x 1 day for flare up of cold sore 6/19/23   Gianni Pro MD   tumeric-ging-olive-oreg-capryl 100 mg-150 mg- 50 mg-150 mg capsule Take by mouth.  10/10/23  Historical Provider, MD        Review of Systems   Constitutional:  Negative for appetite change, chills and fever.   HENT:  Negative for congestion, ear pain, sore throat and trouble swallowing.    Eyes:  Negative for pain, discharge and visual disturbance.   Respiratory:  Negative for apnea, cough and shortness of breath.    Cardiovascular:  Negative for chest pain.        Functional 4 Mets. Patient denies SOB walking up 2 flights of stairs   Cooking, cleaning, grocery shopping   Gastrointestinal:  Negative for abdominal distention, abdominal pain, constipation, diarrhea and vomiting.   Endocrine: Negative for cold intolerance.   Genitourinary:  Negative for difficulty urinating, dysuria and hematuria.   Musculoskeletal:  Negative for arthralgias, back pain, neck pain and neck stiffness.   Neurological:  Negative for dizziness, seizures  "and headaches.   Hematological:  Does not bruise/bleed easily.   Psychiatric/Behavioral:  Negative for agitation and confusion.         Physical Exam  Constitutional:       General: She is not in acute distress.     Appearance: Normal appearance.   HENT:      Head: Normocephalic and atraumatic.      Nose: Nose normal. No congestion.      Mouth/Throat:      Mouth: Mucous membranes are moist.      Pharynx: No posterior oropharyngeal erythema.   Eyes:      Extraocular Movements: Extraocular movements intact.      Conjunctiva/sclera: Conjunctivae normal.   Cardiovascular:      Rate and Rhythm: Normal rate and regular rhythm.      Pulses: Normal pulses.      Heart sounds: Normal heart sounds. No murmur heard.     Comments: Functional 4 Mets. Patient denies SOB walking up 2 flights of stairs; cooking , cleaning, grocery shopping     Pulmonary:      Effort: Pulmonary effort is normal. No respiratory distress.      Breath sounds: Normal breath sounds.   Abdominal:      General: Bowel sounds are normal. There is no distension.      Palpations: Abdomen is soft. There is no mass.      Tenderness: There is no abdominal tenderness. There is no guarding or rebound.   Musculoskeletal:         General: No tenderness. Normal range of motion.      Cervical back: Normal range of motion and neck supple.   Skin:     General: Skin is warm and dry.      Findings: No rash.   Neurological:      General: No focal deficit present.      Mental Status: She is alert and oriented to person, place, and time.   Psychiatric:         Mood and Affect: Mood normal.         Behavior: Behavior normal.          PAT AIRWAY:   Airway:     Mallampati::  II    Neck ROM::  Full   Multiple missing molars      Visit Vitals  /73   Pulse 57   Temp 36.5 °C (97.7 °F)   Resp 18   Ht 1.626 m (5' 4\")   Wt 83.2 kg (183 lb 6.8 oz)   SpO2 99%   BMI 31.48 kg/m²   Smoking Status Former   BSA 1.94 m²        DASI Risk Score    No data to display       Caprini DVT " Assessment    No data to display       Modified Frailty Index    No data to display       CHADS2 Stroke Risk  Current as of just now        N/A 3 - 100%: High Risk   2 - 3%: Medium Risk   0 - 2%: Low Risk     Last Change: N/A          This score determines the patient's risk of having a stroke if the patient has atrial fibrillation.        This score is not applicable to this patient. Components are not calculated.          Revised Cardiac Risk Index    No data to display       Apfel Simplified Score    No data to display       Risk Analysis Index Results This Encounter    No data found in the last 1 encounters.       Lab Results   Component Value Date    WBC 8.1 08/22/2023    HGB 14.5 08/22/2023    HCT 43.8 08/22/2023    MCV 94 08/22/2023     08/22/2023      Lab Results   Component Value Date    GLUCOSE 102 (H) 08/22/2023    CALCIUM 9.5 08/22/2023     08/22/2023    K 4.2 08/22/2023    CO2 24 08/22/2023     (H) 08/22/2023    BUN 18 08/22/2023    CREATININE 0.74 08/22/2023         EKG 10/16/23   NSR  Normal EKG  Vent rate = 60 bpm    ECHO 1/24/23  CONCLUSIONS:   1. Left ventricular systolic function is normal with a 65% estimated ejection fraction.    Venous duplex US 1/16/23  CONCLUSIONS:  Right Upper Venous: The subclavian demonstrates a normal spontaneous and phasic flow.  Left Upper Venous: No evidence of acute deep vein thrombus visualized in the left upper extremity. Contineous flow noted in subclavian vein / axillary vein . Nodule noted left thyroid measuring approx. 1.01 x .76 cm . May wish for further evaluation.     Comparison:  Compared with study from 9/28/2022, Todays study shows no evidence of DVT . However nodule noted on left thyroid and continuous flow noted in subclavian vein.    Assessment and Plan:   Patient is a 63-year-old female scheduled for left thyroidectomy a with Dr. Ignacio on 10/24/23.  Patient has no active cardiac symptoms.   Patient denies any chest pain, tightness,  heaviness, pressure, radiating pain, palpitations, irregular heartbeats, lightheadedness, cough, congestion, shortness of breath, JENNINGS, PND, near syncope, weight loss or gain.     RCRI  1 (type of surgery)  , 6 % Risk of MACE    Cardiac:  HLD - pt not currently taking any lipid lowering meds    Vascular:  DVT - left upper extremity 9/28/23 as result of PICC line and port in place - resolved based on repeat imaging 1/16/23.    Hematology:  Patient instructed to ambulate as soon as possible postoperatively to decrease thromboembolic risk.   Initiate mechanical DVT prophylaxis as soon as possible and initiate chemical prophylaxis when deemed safe from a bleeding standpoint post surgery.     STOP BANG: >50, obese = 2    Caprini: 8    Risk assessment complete.  Patient is scheduled for a intermediate surgical risk procedure.        Preoperative medication instructions were provided and reviewed with the patient.  Any additional testing or evaluation was explained to the patient.  Nothing by mouth instructions were discussed and patient's questions were answered prior to conclusion to this encounter.  Patient verbalized understanding of preoperative instructions given in preadmission testing; discharge instructions available in EMR.    This note was dictated by a speech recognition.  Minor errors may have been detected in a speech recognition.

## 2023-10-17 LAB
ATRIAL RATE: 60 BPM
P AXIS: 68 DEGREES
P OFFSET: 213 MS
P ONSET: 153 MS
PR INTERVAL: 146 MS
Q ONSET: 226 MS
QRS COUNT: 10 BEATS
QRS DURATION: 84 MS
QT INTERVAL: 406 MS
QTC CALCULATION(BAZETT): 406 MS
QTC FREDERICIA: 406 MS
R AXIS: -18 DEGREES
T AXIS: 21 DEGREES
T OFFSET: 429 MS
VENTRICULAR RATE: 60 BPM

## 2023-10-20 ENCOUNTER — PREP FOR PROCEDURE (OUTPATIENT)
Dept: CARDIOTHORACIC SURGERY | Facility: HOSPITAL | Age: 63
End: 2023-10-20
Payer: COMMERCIAL

## 2023-10-20 NOTE — H&P
History Of Present Illness  Iwona Reardon is a 63 y.o. female presenting with a history of both colon cancer and breast cancer. As follow-up from her colon cancer she had a PET scan done last year showing an increased area of uptake in the left thyroid lobe. This corresponds to a 1.3 cm left-sided thyroid nodule with some atypical features, TI-RADS 5. She had a follow-up ultrasound done this year. This was about 9 months after the original. There is been no growth or change in the nodule but it does remain TI-RADS 5.    Also concerning for her is a family history of first-degree relative and her sister who had thyroid cancer. Finally, she did have breast cancer in 2000 and did receive some radiation for that. It is unlikely she had significant radiation exposure to her thyroid but there is always a small possibility.    Biopsy of her nodule along with genetic testing showed  BRAF mutation c/w papillary thyroid cancer.    US THYROID;  5/26/2023 4:30 pm     INDICATION:  Follow-up thyroid nodule.     COMPARISON:  Thyroid ultrasound 08/03/2022     ACCESSION NUMBER(S):  [id number]     ORDERING CLINICIAN:  LOIDA SOSA     TECHNIQUE:  Multiple ultrasonographic images of the thyroid gland were obtained.     FINDINGS:  RIGHT LOBE:  The right lobe of the thyroid measures 1.6 cm x 1.4 cm x 3.3 cm,  which is within normal limits. The right lobe of the thyroid is  homogeneous in echotexture and demonstrates no nodules.     LEFT LOBE:  The left lobe of the thyroid measures 1.0 cm x 1.4 cm x 3.4 cm, which  is within normal limits. The left lobe of the thyroid is homogeneous  in echotexture and demonstrates no nodules.     Within the mid thyroid lobe (images 30 and 31) there is a nodule with  the following features:  Size: 13 x 10 x 9 mm, not significantly changed from prior ultrasound  08/03/2022     Composition:  Solid or almost completely solid (2)  Echogenicity:  Hypoechoic (2)  Shape:  Taller-than-wide (3)  Margin:   Lobulated or irregular (2)  Echogenic Foci:  None or Large comet-tail artifacts (0)     The total score of this nodule is 9 corresponding to a TI-RADS  category  5; (>7 points) Highly suspicious. FNA is recommended if  >1.0cm, Follow up if >0.5cm every year for 5 years..     ISTHMUS:  The isthmus measures approximately 3 mm and is homogeneous in  echotexture and without any identifiable nodules.     CERVICAL LYMPH NODES:  No cervical lymph adenopathy is present.     IMPRESSION:  Stable size and appearance of a suspicious left thyroid nodule which  is considered TI-RADS category 5 on the current study given  hypoechoic appearance. Recommendation per TI-RADS criteria is for  fine-needle aspiration.     Past Medical History  Past Medical History:   Diagnosis Date    Benign endometrial hyperplasia 10/06/2014    Complex endometrial hyperplasia    BRBPR (bright red blood per rectum) 01/23/2023    Breast cancer (CMS/HCC)     Chronic deep vein thrombosis (DVT) of axillary vein of left upper extremity (CMS/HCC) 01/23/2023    eliquis stopped 1/2023    Colon cancer (CMS/HCC)     GERD (gastroesophageal reflux disease) 01/23/2023    Hyperlipidemia     Infection following a procedure, other surgical site, initial encounter 12/04/2014    Wound infection after surgery    MSSA bacteremia 11/22/2022    Other specified diseases of intestine 07/29/2022    Mass of colon    Paraspinal abscess (CMS/HCC) 11/22/2022    Personal history of other benign neoplasm 11/21/2014    History of uterine leiomyoma    Personal history of other diseases of the digestive system 11/18/2013    History of esophageal reflux    Personal history of other infectious and parasitic diseases 05/02/2017    History of herpes zoster    Personal history of other infectious and parasitic diseases 05/02/2017    History of herpes zoster    Postmenopausal bleeding 08/29/2014    Postmenopausal bleeding    Rash and other nonspecific skin eruption 06/29/2017    Skin rash        Surgical History  Past Surgical History:   Procedure Laterality Date    BREAST SURGERY      COLON SURGERY      COLONOSCOPY  06/2023    DILATION AND CURETTAGE OF UTERUS  08/29/2014    Dilation And Curettage    LYMPH NODE BIOPSY      MASTECTOMY, PARTIAL  11/18/2013    Right Breast Partial Mastectomy    OTHER SURGICAL HISTORY  11/18/2013    Axillary Lymphadenectomy - Complete    OTHER SURGICAL HISTORY  04/14/2014    Hysteroscopy    OTHER SURGICAL HISTORY  07/29/2022    Laparoscopic sigmoidectomy    OTHER SURGICAL HISTORY  08/16/2022    Low anterior resection    OTHER SURGICAL HISTORY  08/16/2022    Sigmoidoscopy flexible    SENTINEL LYMPH NODE BIOPSY  11/18/2013    [place] Lymph Node Biopsy    TOTAL ABDOMINAL HYSTERECTOMY W/ BILATERAL SALPINGOOPHORECTOMY  12/04/2014    Total Abdominal Hysterectomy With Removal Of Both Ovaries        Social History  She reports that she quit smoking about 17 years ago. Her smoking use included cigarettes. She has a 35.00 pack-year smoking history. She has never used smokeless tobacco. She reports current alcohol use of about 1.0 standard drink of alcohol per week. She reports that she does not use drugs.    Family History  Family History   Problem Relation Name Age of Onset    Other (skin conditions) Mother      Thyroid cancer Sister      Breast cancer Mother's Sister      Lung cancer Mother's Sister      Other (metastasis to the brain) Mother's Sister      Uterine cancer Mother's Sister      Breast cancer Father's Sister      Pancreatic cancer Maternal Grandfather      Cancer Father Everardo Truong         Allergies  Latex, Penicillins, and Vancomycin    Review of Systems     Physical Exam     Last Recorded Vitals  There were no vitals taken for this visit.    Relevant Results           Assessment/Plan       Left thyroid lobectomy             Mal Ignacio MD

## 2023-10-20 NOTE — H&P (VIEW-ONLY)
History Of Present Illness  Iwona Reardon is a 63 y.o. female presenting with a history of both colon cancer and breast cancer. As follow-up from her colon cancer she had a PET scan done last year showing an increased area of uptake in the left thyroid lobe. This corresponds to a 1.3 cm left-sided thyroid nodule with some atypical features, TI-RADS 5. She had a follow-up ultrasound done this year. This was about 9 months after the original. There is been no growth or change in the nodule but it does remain TI-RADS 5.    Also concerning for her is a family history of first-degree relative and her sister who had thyroid cancer. Finally, she did have breast cancer in 2000 and did receive some radiation for that. It is unlikely she had significant radiation exposure to her thyroid but there is always a small possibility.    Biopsy of her nodule along with genetic testing showed  BRAF mutation c/w papillary thyroid cancer.    US THYROID;  5/26/2023 4:30 pm     INDICATION:  Follow-up thyroid nodule.     COMPARISON:  Thyroid ultrasound 08/03/2022     ACCESSION NUMBER(S):  [id number]     ORDERING CLINICIAN:  LOIDA SOSA     TECHNIQUE:  Multiple ultrasonographic images of the thyroid gland were obtained.     FINDINGS:  RIGHT LOBE:  The right lobe of the thyroid measures 1.6 cm x 1.4 cm x 3.3 cm,  which is within normal limits. The right lobe of the thyroid is  homogeneous in echotexture and demonstrates no nodules.     LEFT LOBE:  The left lobe of the thyroid measures 1.0 cm x 1.4 cm x 3.4 cm, which  is within normal limits. The left lobe of the thyroid is homogeneous  in echotexture and demonstrates no nodules.     Within the mid thyroid lobe (images 30 and 31) there is a nodule with  the following features:  Size: 13 x 10 x 9 mm, not significantly changed from prior ultrasound  08/03/2022     Composition:  Solid or almost completely solid (2)  Echogenicity:  Hypoechoic (2)  Shape:  Taller-than-wide (3)  Margin:   Lobulated or irregular (2)  Echogenic Foci:  None or Large comet-tail artifacts (0)     The total score of this nodule is 9 corresponding to a TI-RADS  category  5; (>7 points) Highly suspicious. FNA is recommended if  >1.0cm, Follow up if >0.5cm every year for 5 years..     ISTHMUS:  The isthmus measures approximately 3 mm and is homogeneous in  echotexture and without any identifiable nodules.     CERVICAL LYMPH NODES:  No cervical lymph adenopathy is present.     IMPRESSION:  Stable size and appearance of a suspicious left thyroid nodule which  is considered TI-RADS category 5 on the current study given  hypoechoic appearance. Recommendation per TI-RADS criteria is for  fine-needle aspiration.     Past Medical History  Past Medical History:   Diagnosis Date    Benign endometrial hyperplasia 10/06/2014    Complex endometrial hyperplasia    BRBPR (bright red blood per rectum) 01/23/2023    Breast cancer (CMS/HCC)     Chronic deep vein thrombosis (DVT) of axillary vein of left upper extremity (CMS/HCC) 01/23/2023    eliquis stopped 1/2023    Colon cancer (CMS/HCC)     GERD (gastroesophageal reflux disease) 01/23/2023    Hyperlipidemia     Infection following a procedure, other surgical site, initial encounter 12/04/2014    Wound infection after surgery    MSSA bacteremia 11/22/2022    Other specified diseases of intestine 07/29/2022    Mass of colon    Paraspinal abscess (CMS/HCC) 11/22/2022    Personal history of other benign neoplasm 11/21/2014    History of uterine leiomyoma    Personal history of other diseases of the digestive system 11/18/2013    History of esophageal reflux    Personal history of other infectious and parasitic diseases 05/02/2017    History of herpes zoster    Personal history of other infectious and parasitic diseases 05/02/2017    History of herpes zoster    Postmenopausal bleeding 08/29/2014    Postmenopausal bleeding    Rash and other nonspecific skin eruption 06/29/2017    Skin rash        Surgical History  Past Surgical History:   Procedure Laterality Date    BREAST SURGERY      COLON SURGERY      COLONOSCOPY  06/2023    DILATION AND CURETTAGE OF UTERUS  08/29/2014    Dilation And Curettage    LYMPH NODE BIOPSY      MASTECTOMY, PARTIAL  11/18/2013    Right Breast Partial Mastectomy    OTHER SURGICAL HISTORY  11/18/2013    Axillary Lymphadenectomy - Complete    OTHER SURGICAL HISTORY  04/14/2014    Hysteroscopy    OTHER SURGICAL HISTORY  07/29/2022    Laparoscopic sigmoidectomy    OTHER SURGICAL HISTORY  08/16/2022    Low anterior resection    OTHER SURGICAL HISTORY  08/16/2022    Sigmoidoscopy flexible    SENTINEL LYMPH NODE BIOPSY  11/18/2013    [place] Lymph Node Biopsy    TOTAL ABDOMINAL HYSTERECTOMY W/ BILATERAL SALPINGOOPHORECTOMY  12/04/2014    Total Abdominal Hysterectomy With Removal Of Both Ovaries        Social History  She reports that she quit smoking about 17 years ago. Her smoking use included cigarettes. She has a 35.00 pack-year smoking history. She has never used smokeless tobacco. She reports current alcohol use of about 1.0 standard drink of alcohol per week. She reports that she does not use drugs.    Family History  Family History   Problem Relation Name Age of Onset    Other (skin conditions) Mother      Thyroid cancer Sister      Breast cancer Mother's Sister      Lung cancer Mother's Sister      Other (metastasis to the brain) Mother's Sister      Uterine cancer Mother's Sister      Breast cancer Father's Sister      Pancreatic cancer Maternal Grandfather      Cancer Father Everardo Truong         Allergies  Latex, Penicillins, and Vancomycin    Review of Systems     Physical Exam     Last Recorded Vitals  There were no vitals taken for this visit.    Relevant Results           Assessment/Plan       Left thyroid lobectomy             Mal Ignacio MD

## 2023-10-24 ENCOUNTER — HOSPITAL ENCOUNTER (OUTPATIENT)
Facility: HOSPITAL | Age: 63
Setting detail: SURGERY ADMIT
Discharge: HOME | End: 2023-10-24
Attending: SURGERY | Admitting: SURGERY
Payer: COMMERCIAL

## 2023-10-24 ENCOUNTER — ANESTHESIA (OUTPATIENT)
Dept: OPERATING ROOM | Facility: HOSPITAL | Age: 63
End: 2023-10-24
Payer: COMMERCIAL

## 2023-10-24 ENCOUNTER — ANESTHESIA EVENT (OUTPATIENT)
Dept: OPERATING ROOM | Facility: HOSPITAL | Age: 63
End: 2023-10-24
Payer: COMMERCIAL

## 2023-10-24 VITALS
HEART RATE: 64 BPM | HEIGHT: 64 IN | TEMPERATURE: 97.2 F | DIASTOLIC BLOOD PRESSURE: 75 MMHG | OXYGEN SATURATION: 100 % | BODY MASS INDEX: 31.54 KG/M2 | WEIGHT: 184.75 LBS | SYSTOLIC BLOOD PRESSURE: 130 MMHG | RESPIRATION RATE: 15 BRPM

## 2023-10-24 DIAGNOSIS — C73 MALIGNANT NEOPLASM OF THYROID GLAND (MULTI): Primary | ICD-10-CM

## 2023-10-24 DIAGNOSIS — E04.1 NONTOXIC SINGLE THYROID NODULE: ICD-10-CM

## 2023-10-24 PROCEDURE — 2500000004 HC RX 250 GENERAL PHARMACY W/ HCPCS (ALT 636 FOR OP/ED): Performed by: SURGERY

## 2023-10-24 PROCEDURE — 88307 TISSUE EXAM BY PATHOLOGIST: CPT | Mod: TC | Performed by: SURGERY

## 2023-10-24 PROCEDURE — 3700000001 HC GENERAL ANESTHESIA TIME - INITIAL BASE CHARGE: Performed by: SURGERY

## 2023-10-24 PROCEDURE — 2500000004 HC RX 250 GENERAL PHARMACY W/ HCPCS (ALT 636 FOR OP/ED): Performed by: STUDENT IN AN ORGANIZED HEALTH CARE EDUCATION/TRAINING PROGRAM

## 2023-10-24 PROCEDURE — 2720000007 HC OR 272 NO HCPCS: Performed by: SURGERY

## 2023-10-24 PROCEDURE — A60240 PR THYROIDECTOMY: Performed by: NURSE ANESTHETIST, CERTIFIED REGISTERED

## 2023-10-24 PROCEDURE — 3700000002 HC GENERAL ANESTHESIA TIME - EACH INCREMENTAL 1 MINUTE: Performed by: SURGERY

## 2023-10-24 PROCEDURE — 7100000001 HC RECOVERY ROOM TIME - INITIAL BASE CHARGE: Performed by: SURGERY

## 2023-10-24 PROCEDURE — 7100000002 HC RECOVERY ROOM TIME - EACH INCREMENTAL 1 MINUTE: Performed by: SURGERY

## 2023-10-24 PROCEDURE — 7100000009 HC PHASE TWO TIME - INITIAL BASE CHARGE: Performed by: SURGERY

## 2023-10-24 PROCEDURE — 3600000009 HC OR TIME - EACH INCREMENTAL 1 MINUTE - PROCEDURE LEVEL FOUR: Performed by: SURGERY

## 2023-10-24 PROCEDURE — 2500000004 HC RX 250 GENERAL PHARMACY W/ HCPCS (ALT 636 FOR OP/ED): Performed by: NURSE ANESTHETIST, CERTIFIED REGISTERED

## 2023-10-24 PROCEDURE — 2500000001 HC RX 250 WO HCPCS SELF ADMINISTERED DRUGS (ALT 637 FOR MEDICARE OP): Performed by: STUDENT IN AN ORGANIZED HEALTH CARE EDUCATION/TRAINING PROGRAM

## 2023-10-24 PROCEDURE — 88307 TISSUE EXAM BY PATHOLOGIST: CPT | Performed by: PATHOLOGY

## 2023-10-24 PROCEDURE — 60220 PARTIAL REMOVAL OF THYROID: CPT | Performed by: SURGERY

## 2023-10-24 PROCEDURE — 7100000010 HC PHASE TWO TIME - EACH INCREMENTAL 1 MINUTE: Performed by: SURGERY

## 2023-10-24 PROCEDURE — A60240 PR THYROIDECTOMY: Performed by: STUDENT IN AN ORGANIZED HEALTH CARE EDUCATION/TRAINING PROGRAM

## 2023-10-24 PROCEDURE — 2500000005 HC RX 250 GENERAL PHARMACY W/O HCPCS: Performed by: SURGERY

## 2023-10-24 PROCEDURE — 2500000005 HC RX 250 GENERAL PHARMACY W/O HCPCS: Performed by: NURSE ANESTHETIST, CERTIFIED REGISTERED

## 2023-10-24 PROCEDURE — 3600000004 HC OR TIME - INITIAL BASE CHARGE - PROCEDURE LEVEL FOUR: Performed by: SURGERY

## 2023-10-24 PROCEDURE — A4217 STERILE WATER/SALINE, 500 ML: HCPCS | Performed by: SURGERY

## 2023-10-24 RX ORDER — DIPHENHYDRAMINE HYDROCHLORIDE 50 MG/ML
12.5 INJECTION INTRAMUSCULAR; INTRAVENOUS ONCE AS NEEDED
Status: DISCONTINUED | OUTPATIENT
Start: 2023-10-24 | End: 2023-10-24 | Stop reason: HOSPADM

## 2023-10-24 RX ORDER — FENTANYL CITRATE 50 UG/ML
INJECTION, SOLUTION INTRAMUSCULAR; INTRAVENOUS AS NEEDED
Status: DISCONTINUED | OUTPATIENT
Start: 2023-10-24 | End: 2023-10-24

## 2023-10-24 RX ORDER — ONDANSETRON HYDROCHLORIDE 2 MG/ML
4 INJECTION, SOLUTION INTRAVENOUS ONCE AS NEEDED
Status: DISCONTINUED | OUTPATIENT
Start: 2023-10-24 | End: 2023-10-24 | Stop reason: HOSPADM

## 2023-10-24 RX ORDER — OXYCODONE HYDROCHLORIDE 5 MG/1
5 TABLET ORAL EVERY 4 HOURS PRN
Status: DISCONTINUED | OUTPATIENT
Start: 2023-10-24 | End: 2023-10-24 | Stop reason: HOSPADM

## 2023-10-24 RX ORDER — LIDOCAINE HYDROCHLORIDE 10 MG/ML
0.1 INJECTION, SOLUTION EPIDURAL; INFILTRATION; INTRACAUDAL; PERINEURAL ONCE
Status: DISCONTINUED | OUTPATIENT
Start: 2023-10-24 | End: 2023-10-24 | Stop reason: HOSPADM

## 2023-10-24 RX ORDER — SODIUM CHLORIDE 0.9 G/100ML
IRRIGANT IRRIGATION AS NEEDED
Status: DISCONTINUED | OUTPATIENT
Start: 2023-10-24 | End: 2023-10-24 | Stop reason: HOSPADM

## 2023-10-24 RX ORDER — SODIUM CHLORIDE, SODIUM LACTATE, POTASSIUM CHLORIDE, CALCIUM CHLORIDE 600; 310; 30; 20 MG/100ML; MG/100ML; MG/100ML; MG/100ML
100 INJECTION, SOLUTION INTRAVENOUS CONTINUOUS
Status: DISCONTINUED | OUTPATIENT
Start: 2023-10-24 | End: 2023-10-24 | Stop reason: HOSPADM

## 2023-10-24 RX ORDER — BUPIVACAINE HYDROCHLORIDE 5 MG/ML
INJECTION, SOLUTION PERINEURAL AS NEEDED
Status: DISCONTINUED | OUTPATIENT
Start: 2023-10-24 | End: 2023-10-24 | Stop reason: HOSPADM

## 2023-10-24 RX ORDER — ROCURONIUM BROMIDE 10 MG/ML
INJECTION, SOLUTION INTRAVENOUS AS NEEDED
Status: DISCONTINUED | OUTPATIENT
Start: 2023-10-24 | End: 2023-10-24

## 2023-10-24 RX ORDER — MIDAZOLAM HYDROCHLORIDE 1 MG/ML
INJECTION INTRAMUSCULAR; INTRAVENOUS AS NEEDED
Status: DISCONTINUED | OUTPATIENT
Start: 2023-10-24 | End: 2023-10-24

## 2023-10-24 RX ORDER — LIDOCAINE HYDROCHLORIDE 20 MG/ML
INJECTION, SOLUTION EPIDURAL; INFILTRATION; INTRACAUDAL; PERINEURAL AS NEEDED
Status: DISCONTINUED | OUTPATIENT
Start: 2023-10-24 | End: 2023-10-24

## 2023-10-24 RX ORDER — LABETALOL HYDROCHLORIDE 5 MG/ML
5 INJECTION, SOLUTION INTRAVENOUS ONCE AS NEEDED
Status: DISCONTINUED | OUTPATIENT
Start: 2023-10-24 | End: 2023-10-24 | Stop reason: HOSPADM

## 2023-10-24 RX ORDER — PHENYLEPHRINE HCL IN 0.9% NACL 1 MG/10 ML
SYRINGE (ML) INTRAVENOUS AS NEEDED
Status: DISCONTINUED | OUTPATIENT
Start: 2023-10-24 | End: 2023-10-24

## 2023-10-24 RX ORDER — PROPOFOL 10 MG/ML
INJECTION, EMULSION INTRAVENOUS AS NEEDED
Status: DISCONTINUED | OUTPATIENT
Start: 2023-10-24 | End: 2023-10-24

## 2023-10-24 RX ORDER — OXYCODONE AND ACETAMINOPHEN 5; 325 MG/1; MG/1
1 TABLET ORAL EVERY 6 HOURS PRN
Qty: 14 TABLET | Refills: 0 | Status: SHIPPED | OUTPATIENT
Start: 2023-10-24 | End: 2023-11-02 | Stop reason: ALTCHOICE

## 2023-10-24 RX ORDER — ONDANSETRON HYDROCHLORIDE 2 MG/ML
INJECTION, SOLUTION INTRAVENOUS AS NEEDED
Status: DISCONTINUED | OUTPATIENT
Start: 2023-10-24 | End: 2023-10-24

## 2023-10-24 RX ADMIN — DEXAMETHASONE SODIUM PHOSPHATE 8 MG: 4 INJECTION, SOLUTION INTRAMUSCULAR; INTRAVENOUS at 08:44

## 2023-10-24 RX ADMIN — MIDAZOLAM HYDROCHLORIDE 2 MG: 1 INJECTION, SOLUTION INTRAMUSCULAR; INTRAVENOUS at 08:24

## 2023-10-24 RX ADMIN — FENTANYL CITRATE 50 MCG: 50 INJECTION INTRAMUSCULAR; INTRAVENOUS at 09:03

## 2023-10-24 RX ADMIN — Medication 100 MCG: at 08:51

## 2023-10-24 RX ADMIN — SODIUM CHLORIDE, POTASSIUM CHLORIDE, SODIUM LACTATE AND CALCIUM CHLORIDE: 600; 310; 30; 20 INJECTION, SOLUTION INTRAVENOUS at 07:30

## 2023-10-24 RX ADMIN — ONDANSETRON 4 MG: 2 INJECTION INTRAMUSCULAR; INTRAVENOUS at 10:15

## 2023-10-24 RX ADMIN — LIDOCAINE HYDROCHLORIDE 100 MG: 20 INJECTION, SOLUTION EPIDURAL; INFILTRATION; INTRACAUDAL; PERINEURAL at 08:32

## 2023-10-24 RX ADMIN — Medication 100 MCG: at 08:44

## 2023-10-24 RX ADMIN — ROCURONIUM BROMIDE 50 MG: 10 INJECTION INTRAVENOUS at 08:33

## 2023-10-24 RX ADMIN — Medication 100 MCG: at 08:50

## 2023-10-24 RX ADMIN — Medication 100 MCG: at 08:42

## 2023-10-24 RX ADMIN — PROPOFOL 200 MG: 10 INJECTION, EMULSION INTRAVENOUS at 08:32

## 2023-10-24 RX ADMIN — OXYCODONE HYDROCHLORIDE 5 MG: 5 TABLET ORAL at 11:45

## 2023-10-24 RX ADMIN — SUGAMMADEX 200 MG: 100 INJECTION, SOLUTION INTRAVENOUS at 10:40

## 2023-10-24 RX ADMIN — Medication 100 MCG: at 09:07

## 2023-10-24 RX ADMIN — FENTANYL CITRATE 50 MCG: 50 INJECTION INTRAMUSCULAR; INTRAVENOUS at 08:31

## 2023-10-24 SDOH — HEALTH STABILITY: MENTAL HEALTH: CURRENT SMOKER: 0

## 2023-10-24 ASSESSMENT — PAIN SCALES - PAIN ASSESSMENT IN ADVANCED DEMENTIA (PAINAD)
FACIALEXPRESSION: SMILING OR INEXPRESSIVE
BREATHING: NORMAL
TOTALSCORE: 0
CONSOLABILITY: NO NEED TO CONSOLE
BODYLANGUAGE: RELAXED

## 2023-10-24 ASSESSMENT — PAIN DESCRIPTION - DESCRIPTORS
DESCRIPTORS: THROBBING
DESCRIPTORS: THROBBING;SORE

## 2023-10-24 ASSESSMENT — COLUMBIA-SUICIDE SEVERITY RATING SCALE - C-SSRS
6. HAVE YOU EVER DONE ANYTHING, STARTED TO DO ANYTHING, OR PREPARED TO DO ANYTHING TO END YOUR LIFE?: NO
1. IN THE PAST MONTH, HAVE YOU WISHED YOU WERE DEAD OR WISHED YOU COULD GO TO SLEEP AND NOT WAKE UP?: NO
2. HAVE YOU ACTUALLY HAD ANY THOUGHTS OF KILLING YOURSELF?: NO

## 2023-10-24 ASSESSMENT — PAIN SCALES - GENERAL
PAINLEVEL_OUTOF10: 5 - MODERATE PAIN
PAINLEVEL_OUTOF10: 0 - NO PAIN
PAINLEVEL_OUTOF10: 0 - NO PAIN
PAINLEVEL_OUTOF10: 5 - MODERATE PAIN
PAINLEVEL_OUTOF10: 5 - MODERATE PAIN
PAINLEVEL_OUTOF10: 0 - NO PAIN

## 2023-10-24 ASSESSMENT — PAIN - FUNCTIONAL ASSESSMENT
PAIN_FUNCTIONAL_ASSESSMENT: 0-10

## 2023-10-24 NOTE — ANESTHESIA PROCEDURE NOTES
Airway  Date/Time: 10/24/2023 8:34 AM  Urgency: elective    Airway not difficult    Staffing  Performed: CRNA   Authorized by: Israel Rosado MD    Performed by: RAMYA Howell-PAMELA  Patient location during procedure: OR    Indications and Patient Condition  Indications for airway management: anesthesia and airway protection  Spontaneous Ventilation: absent  Sedation level: deep  Preoxygenated: yes  Patient position: sniffing  Mask difficulty assessment: 1 - vent by mask    Final Airway Details  Final airway type: endotracheal airway      Successful airway: ETT  Cuffed: yes   Successful intubation technique: direct laryngoscopy  Endotracheal tube insertion site: oral  Blade: Jt  Blade size: #3  ETT size (mm): 7.0  Cormack-Lehane Classification: grade I - full view of glottis  Placement verified by: chest auscultation, capnometry and palpation of cuff   Cuff volume (mL): 7  Measured from: lips  ETT to lips (cm): 20  Number of attempts at approach: 1  Number of other approaches attempted: 0

## 2023-10-24 NOTE — DISCHARGE INSTRUCTIONS
[facility]  DEPARTMENT OF SURGERY    Thank you for allowing us to be involved in your care. We recognize how challenging a hospital admission can be, and we value your trust in us to provide you with all-encompassing and compassionate care.    Procedure(s):  - Left thyroidectomy    Complication(s):  - None    Ongoing issues:  - None    Activity:  - You are safe to walk and climb stairs.  - Minimize sudden neck movements for the next four weeks.    Diet:  - Continue a regular diet.    Cleaning and Hygiene:  - It is okay to shower with gentle soap and water.  - Allow water to gently run over your surgical incisions/wounds.  - Avoid submerging your surgical incisions/wounds as this can introduce infections or compromise the integrity of your healing wounds.  - Do not apply lotions or creams to your incision.  - The steri-strips on top of your neck incision will come off on their own.    Restrictions:  - No swimming, tub soaks, or hot tubs for four weeks.  - No driving or operating heavy machinery while taking narcotic pain medications.    Medications:  - Take all prescriptions as described/ordered.    Signs and symptoms to pay attention to include:  - Swelling around the neck incision  - Bleeding from the neck incision  - Difficulty breathing or change in voice  - Fever greater than 102 degrees Fahrenheit  - Chills  - Increased pain not relieved with medication  - Redness around an incision  - Severe nausea or vomiting  - Severe constipation or diarrhea  If you notice any of these, call our office or seek professional medical care in an urgent care clinic or in the emergency department.    Follow-Up:  - Please refer to your After Visit Summary for a full listing of your upcoming appointments, including any with your surgical team and with your primary care provider.  - You will see Dr. Ignacio in clinic in 2-3 weeks.    Additional Information:  - None    If you have any questions or  concerns, please contact our office.

## 2023-10-24 NOTE — ANESTHESIA POSTPROCEDURE EVALUATION
Patient: Iwona Reardon    Procedure Summary       Date: 10/24/23 Room / Location: [facility] / [facility]    Anesthesia Start: 0824 Anesthesia Stop: 1051    Procedure: Left Thyroidectomy (Left) Diagnosis:       Malignant neoplasm of thyroid gland (CMS/HCC)      Nontoxic single thyroid nodule      (Malignant neoplasm of thyroid gland (CMS/HCC) [C73])      (Nontoxic single thyroid nodule [E04.1])    Surgeons: Mal Ignacio MD Responsible Provider: Israel Rosado MD    Anesthesia Type: general ASA Status: 3            Anesthesia Type: general    Vitals Value Taken Time   /65 10/24/23 1147   Temp 36.2 °C (97.2 °F) 10/24/23 1200   Pulse 69 10/24/23 1159   Resp 15 10/24/23 1200   SpO2 98 % 10/24/23 1159   Vitals shown include unvalidated device data.    Anesthesia Post Evaluation    Patient location during evaluation: bedside  Level of consciousness: awake  Pain management: adequate  Multimodal analgesia pain management approach  Cardiovascular status: acceptable  Respiratory status: acceptable  Hydration status: acceptable        No notable events documented.

## 2023-10-24 NOTE — ANESTHESIA PREPROCEDURE EVALUATION
Patient: Iwona Reardon    Procedure Information       Date/Time: 10/24/23 0830    Procedure: Left Thyroidectomy (Left)    Location: [facility] / [facility]    Surgeons: Mal Ignacio MD          Hx colon/breast CA with +PET thyroid L lobe. Former smoker    Relevant Problems   Cardiovascular   (+) Mixed hyperlipidemia      Endocrine   (+) Class 1 obesity due to excess calories without serious comorbidity with body mass index (BMI) of 31.0 to 31.9 in adult      GI   (+) Rectal cancer (CMS/HCC)      GI/Hepatic   (+) Rectal cancer (CMS/HCC)      Infectious Disease   (+) Recurrent cold sores       Clinical information reviewed:   Tobacco  Allergies  Meds   Med Hx  Surg Hx   Fam Hx  Soc Hx        NPO Detail:  NPO/Void Status  Date of Last Liquid: 10/23/23  Time of Last Liquid: 1930  Date of Last Solid: 10/23/23  Time of Last Solid: 1800  Last Intake Type: Clear fluids         Physical Exam    Airway  Mallampati: II  TM distance: >3 FB  Neck ROM: full     Cardiovascular   Rhythm: regular  Rate: normal     Dental    Pulmonary   Breath sounds clear to auscultation     Abdominal            Anesthesia Plan    ASA 3     general     The patient is not a current smoker.    intravenous induction   Anesthetic plan and risks discussed with patient.    Plan discussed with CRNA.

## 2023-10-24 NOTE — OP NOTE
Left Thyroidectomy (L) Operative Note     Date: 10/24/2023  OR Location: [facility] OR    Name: Iwona Reardon, : 1960, Age: 63 y.o., MRN: [id number], Sex: female    Diagnosis  Pre-op Diagnosis     * Malignant neoplasm of thyroid gland (CMS/HCC) [C73]     * Nontoxic single thyroid nodule [E04.1] Post-op Diagnosis     * Malignant neoplasm of thyroid gland (CMS/HCC) [C73]     * Nontoxic single thyroid nodule [E04.1]     Procedures    * Left Thyroidectomy    Surgeons      * Mal Ignacio - Primary    Resident/Fellow/Other Assistant:  Surgeon(s) and Role: Reddy Coyne MD    Procedure Summary  Anesthesia: General  ASA: III  Anesthesia Staff: Anesthesiologist: Israel Rosado MD  CRNA: RAMYA Howell-CRNA  Estimated Blood Loss: 5mL  Intra-op Medications:   Medication Name Total Dose   sodium chloride 0.9 % irrigation solution 1,000 mL              Anesthesia Record               Intraprocedure I/O Totals          Intake    Phenylephrine Drip 0.00 mL    The total shown is the total volume documented since Anesthesia Start was filed.    Total Intake 0 mL          Specimen:   ID Type Source Tests Collected by Time   1 : LEFT THYROID LOBE, Double tail stitch marks isthmus, Single tail stitch marks left superior pole Tissue THYROID LOBECTOMY LEFT SURGICAL PATHOLOGY EXAM Mal Ignacio MD 10/24/2023 1000        Staff:   Circulator: Stephanie Licea RN; Shellie YBARRA RN  Scrub Person: Valery Love         Drains and/or Catheters: * None in log *    Tourniquet Times:         Implants:     Findings: Left thyroid nodule    Indications: Iwona Reardon is an 63 y.o. female who is having surgery for Malignant neoplasm of thyroid gland (CMS/HCC) [C73]  Nontoxic single thyroid nodule [E04.1].  Patient had a PET scan positive.  Biopsy of this came back as [place] class III.  However her genetic testing showed a BRAF V6 100 mutation denoting papillary thyroid cancer.  She has no nodule disease in the right  thyroid lobe therefore he recommended a left thyroid lobectomy with possible total thyroidectomy if any additional findings were noted during surgery.    The patient was seen in the preoperative area. The risks, benefits, complications, treatment options, non-operative alternatives, expected recovery and outcomes were discussed with the patient. The possibilities of reaction to medication, pulmonary aspiration, injury to surrounding structures, bleeding, recurrent infection, the need for additional procedures, failure to diagnose a condition, and creating a complication requiring transfusion or operation were discussed with the patient. The patient concurred with the proposed plan, giving informed consent.  The site of surgery was properly noted/marked if necessary per policy. The patient has been actively warmed in preoperative area. Venous thrombosis prophylaxis have been ordered including bilateral sequential compression devices    Procedure Details: After induction of anesthetic patient was prepped and draped in usual sterile fashion with a towel behind the shoulders and the neck gently extended.  She had SCDs on for DVT prophylaxis.  Made a 5 cm cervical collar incision.  Divided down through platysma electrocautery.  We raised subplatysmal flaps superior to the notch and the thyroid cartilage inferior to the sternal notch lateral to the sternocleidomastoid muscles.  We then  the strap muscles in the midline.  She did have some communicating branches between the anterior jugular veins that were ligated.  We then retracted left sternohyoid left sternothyroid muscles out laterally to level the carotid artery.  Directly over the nodule there was 1 small area of muscle with that was a bit adherent to the tumor.  This could just be biopsy trauma versus possible local invasion or desmoplastic reaction therefore resected a small area of muscle en bloc with the nodule.  We then identified the middle thyroid  vein which was ligated divided with 3-0 silk ties.  Patient had a prominent inferior thyroid vein and we also ligated and then took some the tissue anteriorly off the trachea.  We then went up and identified the cricothyroid space.  We dissected medial to lateral around the superior pole vessels with 2-0 and 3-0 silk ties proximally LigaSure toward the thyroid specimen side.    We then went back down over the trachea began working on the posterior lateral aspect of the thyroid.  The 5 o'clock position we identified the left inferior parathyroid gland was peeled away intact in all vascularized pedicle.  The anesthesia team and placed an esophageal temperature probe which was palpable.  We dissected down into the tracheoesophageal groove and identified the left recurrent laryngeal nerve.  We stayed anteromedial to the nerve with all 3-0 silk ties.  LigaSure was not used in this area.  As we follow the nerve up, at the 2 o'clock position was a normal-appearing left superior parathyroid gland peeled away intact well-vascularized pedicle.  We then followed the last the nerve up into the cricothyroid musculature.    Then rolled the thyroid went up onto the trachea.  The nodule itself was well contained in the thyroid.  No posterior extension into the trachea or any surrounding soft tissues.  We divided behind the isthmus over the junction of the right thyroid lobe.  There is a small area of pyramidal thyroid tissue that came up in the midline.  We divided on either side of it with Bovie electrocautery and then transected with the LigaSure device once it thinned out the top.  We palpated around the rest of the neck there were no visual or abdomen or palpable abnormal central neck lymph nodes.  We then transected the isthmus at the junction of the right thyroid lobe with the LigaSure device which gave good hemostasis and we removed the specimen.  Right thyroid lobe was normal.  Specimen was marked with a double tailed  suture on the isthmus single tail suture on the left superior pole and it was sent off to pathology.    We irrigated the neck out well and achieved hemostasis.  Recurrent nerve was intact along its course.  Parathyroids were well vascularized.  Overall hemostasis was good.  We used some Lolly powder for final hemostasis and then closed traps and platysma with 3-0 Vicryl skin with a 4 Monocryl half percent Marcaine was used local anesthetic.  Complications:  None; patient tolerated the procedure well.    Disposition: PACU - hemodynamically stable.  Condition: stable         Additional Details:     Attending Attestation: I was present and scrubbed for the entire procedure.    Mal Ignacio  Phone Number: [phone]

## 2023-10-24 NOTE — BRIEF OP NOTE
[facility]  DEPARTMENT OF SURGERY    POST-OP NOTE    Date: 10/24/2023  OR Location: [facility] OR    Name: Iwona Reardon, : 1960, Age: 63 y.o., MRN: [id number], Sex: female      PREOPERATIVE DIAGNOSIS:  Papillary thyroid cancer    POSTOPERATIVE DIAGNOSIS:  Papillary thyroid cancer    PROCEDURE:  Left thyroidectomy    ATTENDING:  Mal Ignacio MD    RESIDENT, FELLOW, AND/OR ASSIST:  Reddy Coyne MD PGY4, Gadiel Gaona MS3    ANESTHESIA:  GETA    ESTIMATED BLOOD LOSS:  10 cc    IV FLUIDS:  650 cc crystalloid    FINDINGS:  Firm ~1 cm nodule in L thyroid, contained within lobe.    SPECIMEN(S):  Left thyroid lobe (double stitch isthmus, single stitch L superior pole)    COMPLICATIONS:  None    DISPOSITION:  PACU    PATIENT CONDITION:  Satisfactory    -------------------------------------    BRIEF POSTOPERATIVE PLAN:  Home  Pain medication Rx  Follow up in 2-3 weeks        Attending Attestation:     Mal Ignacio  Phone Number: [phone]

## 2023-10-24 NOTE — SIGNIFICANT EVENT
Discharge instructions reviewed with patient and . Both  and patient verbalized understanding. Iv removed catheter intact upon removal. Patient tolerated well.

## 2023-10-25 ASSESSMENT — PAIN SCALES - GENERAL: PAINLEVEL_OUTOF10: 0 - NO PAIN

## 2023-11-02 ENCOUNTER — OFFICE VISIT (OUTPATIENT)
Dept: SURGERY | Facility: CLINIC | Age: 63
End: 2023-11-02
Payer: COMMERCIAL

## 2023-11-02 VITALS
DIASTOLIC BLOOD PRESSURE: 76 MMHG | BODY MASS INDEX: 31.76 KG/M2 | SYSTOLIC BLOOD PRESSURE: 143 MMHG | WEIGHT: 185 LBS | HEART RATE: 83 BPM

## 2023-11-02 DIAGNOSIS — E89.0 STATUS POST PARTIAL THYROIDECTOMY: ICD-10-CM

## 2023-11-02 LAB
LABORATORY COMMENT REPORT: NORMAL
PATH REPORT.FINAL DX SPEC: NORMAL
PATH REPORT.GROSS SPEC: NORMAL
PATH REPORT.RELEVANT HX SPEC: NORMAL
PATH REPORT.TOTAL CANCER: NORMAL
PATHOLOGY SYNOPTIC REPORT: NORMAL

## 2023-11-02 PROCEDURE — 99024 POSTOP FOLLOW-UP VISIT: CPT | Performed by: SURGERY

## 2023-11-02 PROCEDURE — 3008F BODY MASS INDEX DOCD: CPT | Performed by: SURGERY

## 2023-11-02 PROCEDURE — 1036F TOBACCO NON-USER: CPT | Performed by: SURGERY

## 2023-11-02 NOTE — PROGRESS NOTES
Subjective   Patient ID: Iwona Reardon is a 63 y.o. female who presents for postoperative visit after left thyroid lobectomy.  HPI I saw Mrs. Reardon back in surgery clinic today.  She is now just about 2 weeks status post left thyroid lobectomy for a [place] class III nodule.  She did well with the surgery was discharged home as an outpatient.    Today in the office she feels good.  Minimal pain.  No difficulty breathing or swallowing no troubles with her voice.    She is having a little bit of fatigue but otherwise no symptoms of hypothyroidism.    Review of Systems    Objective   Physical Exam  Vitals reviewed.   Constitutional:       Appearance: Normal appearance.      Comments: Her voice is normal   Neck:      Comments: Neck incision healed well no seroma.  Left lobe is surgically absent.  Neurological:      Mental Status: She is alert.         Assessment/Plan    Mrs. Reardon did extremely well with left thyroid lobectomy.  I informed her that her pathology is not back yet and I will call her once that does come in.    Overall she did very well with surgery.  She has had no postop issues or complications.  Her incisions healing nicely.    She has some mild fatigue.  I gave her requisitions to check a TSH and free T4 in about another 2 weeks.  That would determine if she had any hypothyroidism that required thyroid hormone replacement.  We can call her with results.    I had my nurse review some wound care and stretching exercises with her.

## 2023-11-08 ENCOUNTER — TELEPHONE (OUTPATIENT)
Dept: SURGICAL ONCOLOGY | Facility: HOSPITAL | Age: 63
End: 2023-11-08
Payer: COMMERCIAL

## 2023-11-08 NOTE — TELEPHONE ENCOUNTER
I notified patient by phone that her final pathology did show a 1.6 cm classic variant of papillary thyroid cancer.  There was 1 lymph node removed with the specimen that was negative for cancer.  There was no extrathyroidal extension and no vascular invasion.    She understood all this and is happy with the outcome.

## 2023-11-09 ENCOUNTER — LAB (OUTPATIENT)
Dept: LAB | Facility: LAB | Age: 63
End: 2023-11-09
Payer: COMMERCIAL

## 2023-11-09 DIAGNOSIS — C18.7 MALIGNANT NEOPLASM OF SIGMOID COLON (MULTI): Primary | ICD-10-CM

## 2023-11-09 LAB
ALBUMIN SERPL BCP-MCNC: 4.6 G/DL (ref 3.4–5)
ALP SERPL-CCNC: 75 U/L (ref 33–136)
ALT SERPL W P-5'-P-CCNC: 18 U/L (ref 7–45)
ANION GAP SERPL CALC-SCNC: 13 MMOL/L (ref 10–20)
AST SERPL W P-5'-P-CCNC: 14 U/L (ref 9–39)
BASOPHILS # BLD AUTO: 0.06 X10*3/UL (ref 0–0.1)
BASOPHILS NFR BLD AUTO: 0.7 %
BILIRUB SERPL-MCNC: 0.3 MG/DL (ref 0–1.2)
BUN SERPL-MCNC: 23 MG/DL (ref 6–23)
CALCIUM SERPL-MCNC: 9.6 MG/DL (ref 8.6–10.6)
CEA SERPL-MCNC: 1.9 UG/L
CHLORIDE SERPL-SCNC: 106 MMOL/L (ref 98–107)
CO2 SERPL-SCNC: 27 MMOL/L (ref 21–32)
CREAT SERPL-MCNC: 0.69 MG/DL (ref 0.5–1.05)
EOSINOPHIL # BLD AUTO: 0.23 X10*3/UL (ref 0–0.7)
EOSINOPHIL NFR BLD AUTO: 2.5 %
ERYTHROCYTE [DISTWIDTH] IN BLOOD BY AUTOMATED COUNT: 12.7 % (ref 11.5–14.5)
GFR SERPL CREATININE-BSD FRML MDRD: >90 ML/MIN/1.73M*2
GLUCOSE SERPL-MCNC: 81 MG/DL (ref 74–99)
HCT VFR BLD AUTO: 43.8 % (ref 36–46)
HGB BLD-MCNC: 14.2 G/DL (ref 12–16)
IMM GRANULOCYTES # BLD AUTO: 0.03 X10*3/UL (ref 0–0.7)
IMM GRANULOCYTES NFR BLD AUTO: 0.3 % (ref 0–0.9)
LYMPHOCYTES # BLD AUTO: 2.98 X10*3/UL (ref 1.2–4.8)
LYMPHOCYTES NFR BLD AUTO: 32.3 %
MCH RBC QN AUTO: 30.7 PG (ref 26–34)
MCHC RBC AUTO-ENTMCNC: 32.4 G/DL (ref 32–36)
MCV RBC AUTO: 95 FL (ref 80–100)
MONOCYTES # BLD AUTO: 0.63 X10*3/UL (ref 0.1–1)
MONOCYTES NFR BLD AUTO: 6.8 %
NEUTROPHILS # BLD AUTO: 5.29 X10*3/UL (ref 1.2–7.7)
NEUTROPHILS NFR BLD AUTO: 57.4 %
NRBC BLD-RTO: 0 /100 WBCS (ref 0–0)
PLATELET # BLD AUTO: 313 X10*3/UL (ref 150–450)
POTASSIUM SERPL-SCNC: 4.7 MMOL/L (ref 3.5–5.3)
PROT SERPL-MCNC: 7.1 G/DL (ref 6.4–8.2)
RBC # BLD AUTO: 4.62 X10*6/UL (ref 4–5.2)
SODIUM SERPL-SCNC: 141 MMOL/L (ref 136–145)
WBC # BLD AUTO: 9.2 X10*3/UL (ref 4.4–11.3)

## 2023-11-09 PROCEDURE — 85025 COMPLETE CBC W/AUTO DIFF WBC: CPT

## 2023-11-09 PROCEDURE — 36415 COLL VENOUS BLD VENIPUNCTURE: CPT

## 2023-11-09 PROCEDURE — 80053 COMPREHEN METABOLIC PANEL: CPT

## 2023-11-09 PROCEDURE — 82378 CARCINOEMBRYONIC ANTIGEN: CPT

## 2023-11-10 ASSESSMENT — PATIENT HEALTH QUESTIONNAIRE - PHQ9
9. THOUGHTS THAT YOU WOULD BE BETTER OFF DEAD, OR OF HURTING YOURSELF: NOT AT ALL
1. LITTLE INTEREST OR PLEASURE IN DOING THINGS: NOT AT ALL
2. FEELING DOWN, DEPRESSED, IRRITABLE, OR HOPELESS: 0
8. MOVING OR SPEAKING SO SLOWLY THAT OTHER PEOPLE COULD HAVE NOTICED. OR THE OPPOSITE, BEING SO FIGETY OR RESTLESS THAT YOU HAVE BEEN MOVING AROUND A LOT MORE THAN USUAL: NOT AT ALL
3. TROUBLE FALLING OR STAYING ASLEEP OR SLEEPING TOO MUCH: NOT AT ALL
1. LITTLE INTEREST OR PLEASURE IN DOING THINGS: 0
SUM OF ALL RESPONSES TO PHQ QUESTIONS 1-9: 0
9. THOUGHTS THAT YOU WOULD BE BETTER OFF DEAD, OR OF HURTING YOURSELF: 0
9. THOUGHTS THAT YOU WOULD BE BETTER OFF DEAD, OR OF HURTING YOURSELF: NOT AT ALL
5. POOR APPETITE OR OVEREATING: 0
SUM OF ALL RESPONSES TO PHQ QUESTIONS 1-9: 0
4. FEELING TIRED OR HAVING LITTLE ENERGY: NOT AT ALL
6. FEELING BAD ABOUT YOURSELF - OR THAT YOU ARE A FAILURE OR HAVE LET YOURSELF OR YOUR FAMILY DOWN: 0
4. FEELING TIRED OR HAVING LITTLE ENERGY: 0
1. LITTLE INTEREST OR PLEASURE IN DOING THINGS: NOT AT ALL
6. FEELING BAD ABOUT YOURSELF - OR THAT YOU ARE A FAILURE OR HAVE LET YOURSELF OR YOUR FAMILY DOWN: NOT AT ALL
7. TROUBLE CONCENTRATING ON THINGS, SUCH AS READING THE NEWSPAPER OR WATCHING TELEVISION: NOT AT ALL
3. TROUBLE FALLING OR STAYING ASLEEP OR SLEEPING TOO MUCH: 0
5. POOR APPETITE OR OVEREATING: NOT AT ALL
2. FEELING DOWN, DEPRESSED, IRRITABLE, OR HOPELESS: NOT AT ALL
7. TROUBLE CONCENTRATING ON THINGS, SUCH AS READING THE NEWSPAPER OR WATCHING TELEVISION: 0
7. TROUBLE CONCENTRATING ON THINGS, SUCH AS READING THE NEWSPAPER OR WATCHING TELEVISION: NOT AT ALL
2. FEELING DOWN, DEPRESSED OR HOPELESS: NOT AT ALL
8. MOVING OR SPEAKING SO SLOWLY THAT OTHER PEOPLE COULD HAVE NOTICED. OR THE OPPOSITE, BEING SO FIGETY OR RESTLESS THAT YOU HAVE BEEN MOVING AROUND A LOT MORE THAN USUAL: 0
5. POOR APPETITE OR OVEREATING: NOT AT ALL
3. TROUBLE FALLING OR STAYING ASLEEP OR SLEEPING TOO MUCH: NOT AT ALL
6. FEELING BAD ABOUT YOURSELF - OR THAT YOU ARE A FAILURE OR HAVE LET YOURSELF OR YOUR FAMILY DOWN: NOT AT ALL
8. MOVING OR SPEAKING SO SLOWLY THAT OTHER PEOPLE COULD HAVE NOTICED. OR THE OPPOSITE, BEING SO FIGETY OR RESTLESS THAT YOU HAVE BEEN MOVING AROUND A LOT MORE THAN USUAL: NOT AT ALL
4. FEELING TIRED OR HAVING LITTLE ENERGY: NOT AT ALL

## 2023-11-14 ENCOUNTER — OFFICE VISIT (OUTPATIENT)
Dept: HEMATOLOGY/ONCOLOGY | Facility: CLINIC | Age: 63
End: 2023-11-14
Payer: COMMERCIAL

## 2023-11-14 VITALS
RESPIRATION RATE: 18 BRPM | SYSTOLIC BLOOD PRESSURE: 129 MMHG | BODY MASS INDEX: 32.39 KG/M2 | WEIGHT: 188.71 LBS | OXYGEN SATURATION: 98 % | DIASTOLIC BLOOD PRESSURE: 77 MMHG | TEMPERATURE: 97.7 F | HEART RATE: 65 BPM

## 2023-11-14 DIAGNOSIS — C20 RECTAL CANCER (MULTI): Primary | ICD-10-CM

## 2023-11-14 PROCEDURE — 99213 OFFICE O/P EST LOW 20 MIN: CPT | Performed by: INTERNAL MEDICINE

## 2023-11-14 PROCEDURE — 1036F TOBACCO NON-USER: CPT | Performed by: INTERNAL MEDICINE

## 2023-11-14 PROCEDURE — 3008F BODY MASS INDEX DOCD: CPT | Performed by: INTERNAL MEDICINE

## 2023-11-14 ASSESSMENT — COLUMBIA-SUICIDE SEVERITY RATING SCALE - C-SSRS
2. HAVE YOU ACTUALLY HAD ANY THOUGHTS OF KILLING YOURSELF?: NO
6. HAVE YOU EVER DONE ANYTHING, STARTED TO DO ANYTHING, OR PREPARED TO DO ANYTHING TO END YOUR LIFE?: NO
1. IN THE PAST MONTH, HAVE YOU WISHED YOU WERE DEAD OR WISHED YOU COULD GO TO SLEEP AND NOT WAKE UP?: NO

## 2023-11-14 ASSESSMENT — PATIENT HEALTH QUESTIONNAIRE - PHQ9
1. LITTLE INTEREST OR PLEASURE IN DOING THINGS: NOT AT ALL
2. FEELING DOWN, DEPRESSED OR HOPELESS: NOT AT ALL
SUM OF ALL RESPONSES TO PHQ9 QUESTIONS 1 AND 2: 0

## 2023-11-14 ASSESSMENT — ENCOUNTER SYMPTOMS
LOSS OF SENSATION IN FEET: 0
DEPRESSION: 0
OCCASIONAL FEELINGS OF UNSTEADINESS: 0

## 2023-11-14 ASSESSMENT — PAIN SCALES - GENERAL: PAINLEVEL: 0-NO PAIN

## 2023-11-14 NOTE — PROGRESS NOTES
Patient ID: Iwona Reardon is a 63 y.o. female from [place], [place].     Referring Physician: No referring provider defined for this encounter.    Primary Care Provider: Gianni Pro MD    Cancer History:          Breast         AJCC Edition: 7th (AJCC), Diagnosis Date: 21-Jun-2006, IA- Right, T1b pN0 M0          Colon and Rectum         AJCC Edition: 8th (AJCC), Diagnosis Date: Jul 2022, IIIB, pT3 pN1b cM0      Treatment Synopsis:    1. Sigmoid cancer  January 2022 started having constipation and blood in stool  She was referred to GI which  was quite delayed  Colonoscopy finally done in June  She now has a confirmed upper rectosigmoid CA - treated like  a colon cancer  LAR 7/6/22  [id number]  2/20 + nodes. Small vessel invasion  NICHOLAS     2. Right-sided breast cancer.   CHARACTERISTICS: This is a 51-year-old patient presenting in July 2006 with a right-sided 0.8 cm grade 3 infiltrating ductal carcinoma with triple-negative characteristics. She has seven negative lymph nodes and two negative sentinel lymph nodes.      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 Raymond also had breast cancer. She has some paternal and maternal cousins with breast cancer as well and a sister with a thyroid  malignancy.      PRIOR TREATMENT:   1. Right lumpectomy and axillary node evaluation in July 2006.   2. Right-sided accelerated partial breast radiation in August 2006 per NSABP B-39.   3. She had four cycles of Adriamycin and Cytoxan, completed in November 2006.            Treatment History:    2022-8-9: Chemotherapy: Cycle 1 Folfox/ adjuvant  2022-11-4: Chemotherapy: adjuvant therapy aborted with sepsis and central line complications          Past Medical History: Iwona has a past medical history of Benign endometrial hyperplasia (10/06/2014), BRBPR (bright red blood per rectum) (01/23/2023),  Breast cancer (CMS/HCC), Chronic deep vein thrombosis (DVT) of axillary vein of left upper extremity (CMS/HCC) (01/23/2023), Colon cancer (CMS/HCC), GERD (gastroesophageal reflux disease) (01/23/2023), Hyperlipidemia, Infection following a procedure, other surgical site, initial encounter (12/04/2014), MSSA bacteremia (11/22/2022), Other specified diseases of intestine (07/29/2022), 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  Surgical History:  Iwona has a past surgical history that includes Other surgical history (11/18/2013); Mastectomy, partial (11/18/2013); [place] lymph node biopsy (11/18/2013); Dilation and curettage of uterus (08/29/2014); Total abdominal hysterectomy w/ bilateral salpingoophorectomy (12/04/2014); Other surgical history (04/14/2014); Other surgical history (07/29/2022); Other surgical history (08/16/2022); Other surgical history (08/16/2022); Breast surgery; Colon surgery; Lymph node biopsy; and Colonoscopy (06/2023).  Social History:  Iwona reports that she quit smoking about 17 years ago. Her smoking use included cigarettes. She has a 35.00 pack-year smoking history. She has never used smokeless tobacco. She reports current alcohol use of about 1.0 standard drink of alcohol per week. She reports that she does not use drugs.  Family History:    Family History   Problem Relation Name Age of Onset    Other (skin conditions) Mother      Thyroid cancer Sister      Breast cancer Mother's Sister      Lung cancer Mother's Sister      Other (metastasis to the brain) Mother's Sister      Uterine cancer Mother's Sister       Breast cancer Father's Sister      Pancreatic cancer Maternal Grandfather      Cancer Father Everardo Truong      Family Oncology History:  Cancer-related family history includes Breast cancer in her father's sister and mother's sister; Cancer in her father; Lung cancer in her mother's sister; Pancreatic cancer in her maternal grandfather; Thyroid cancer in her sister; Uterine cancer in her mother's sister.        SUBJECTIVE:    History of Present Illness:  Iwona Reardon is a 63 y.o. female who was referred by No ref. provider found and presents with Follow-up    HPI        OBJECTIVE:    VS / Pain:  /77   Pulse 65   Temp 36.5 °C (97.7 °F)   Resp 18   Wt 85.6 kg (188 lb 11.4 oz)   SpO2 98%   BMI 32.39 kg/m²   BSA: 1.97 meters squared   Pain Scale: 0    Daily Weight  11/14/23 : 85.6 kg (188 lb 11.4 oz)  11/02/23 : 83.9 kg (185 lb)  10/24/23 : 83.8 kg (184 lb 11.9 oz)      Physical Exam    Performance Status:       Diagnostic Results         WBC   Date/Time Value Ref Range Status   11/09/2023 03:46 PM 9.2 4.4 - 11.3 x10*3/uL Final   08/22/2023 07:04 AM 8.1 4.4 - 11.3 x10E9/L Final   08/16/2023 07:40 AM CANCELED       Comment:     Result canceled by the ancillary.   05/04/2023 04:37 PM 8.5 4.4 - 11.3 x10E9/L Final     Hemoglobin   Date Value Ref Range Status   11/09/2023 14.2 12.0 - 16.0 g/dL Final   08/22/2023 14.5 12.0 - 16.0 g/dL Final   08/16/2023 CANCELED       Comment:     Result canceled by the ancillary.   05/04/2023 13.9 12.0 - 16.0 g/dL Final     MCV   Date/Time Value Ref Range Status   11/09/2023 03:46 PM 95 80 - 100 fL Final   08/22/2023 07:04 AM 94 80 - 100 fL Final   08/16/2023 07:40 AM CANCELED       Comment:     Result canceled by the ancillary.   05/04/2023 04:37 PM 91 80 - 100 fL Final     Platelets   Date/Time Value Ref Range Status   11/09/2023 03:46  150 - 450 x10*3/uL Final   08/22/2023 07:04  150 - 450 x10E9/L Final   08/16/2023 07:40 AM CANCELED       Comment:      Result canceled by the ancillary.   05/04/2023 04:37  150 - 450 x10E9/L Final     Neutrophils Absolute   Date/Time Value Ref Range Status   11/09/2023 03:46 PM 5.29 1.20 - 7.70 x10*3/uL Final     Comment:     Percent differential counts (%) should be interpreted in the context of the absolute cell counts (cells/uL).   08/22/2023 07:04 AM 5.04 1.20 - 7.70 x10E9/L Final   08/16/2023 07:40 AM CANCELED       Comment:      Percent differential counts (%) should be interpreted in the   context of the absolute cell counts (cells/L).    Result canceled by the ancillary.     05/04/2023 04:37 PM 5.70 1.20 - 7.70 x10E9/L Final     Bilirubin, Total   Date/Time Value Ref Range Status   11/09/2023 03:46 PM 0.3 0.0 - 1.2 mg/dL Final     Total Bilirubin   Date/Time Value Ref Range Status   08/22/2023 07:04 AM 0.4 0.0 - 1.2 mg/dL Final   08/16/2023 11:11 AM CANCELED       Comment:     Result canceled by the ancillary.   08/07/2023 07:46 AM 0.5 0.0 - 1.2 mg/dL Final     AST   Date/Time Value Ref Range Status   11/09/2023 03:46 PM 14 9 - 39 U/L Final   08/22/2023 07:04 AM 14 9 - 39 U/L Final   08/16/2023 11:11 AM CANCELED       Comment:     Result canceled by the ancillary.   08/07/2023 07:46 AM 17 9 - 39 U/L Final     ALT   Date/Time Value Ref Range Status   11/09/2023 03:46 PM 18 7 - 45 U/L Final     Comment:     Patients treated with Sulfasalazine may generate falsely decreased results for ALT.     ALT (SGPT)   Date/Time Value Ref Range Status   08/22/2023 07:04 AM 16 7 - 45 U/L Final     Comment:      Patients treated with Sulfasalazine may generate    falsely decreased results for ALT.     08/16/2023 11:11 AM CANCELED       Comment:      Patients treated with Sulfasalazine may generate    falsely decreased results for ALT.    Result canceled by the ancillary.     08/07/2023 07:46 AM 18 7 - 45 U/L Final     Comment:      Patients treated with Sulfasalazine may generate    falsely decreased results for ALT.       Creatinine  "  Date/Time Value Ref Range Status   11/09/2023 03:46 PM 0.69 0.50 - 1.05 mg/dL Final   08/22/2023 07:04 AM 0.74 0.50 - 1.05 mg/dL Final   08/16/2023 11:11 AM CANCELED       Comment:     Result canceled by the ancillary.   08/07/2023 07:46 AM 0.85 0.50 - 1.05 mg/dL Final     Urea Nitrogen   Date/Time Value Ref Range Status   11/09/2023 03:46 PM 23 6 - 23 mg/dL Final   08/22/2023 07:04 AM 18 6 - 23 mg/dL Final   08/16/2023 11:11 AM CANCELED       Comment:     Result canceled by the ancillary.   08/07/2023 07:46 AM 23 6 - 23 mg/dL Final     Albumin   Date/Time Value Ref Range Status   11/09/2023 03:46 PM 4.6 3.4 - 5.0 g/dL Final   08/22/2023 07:04 AM 4.5 3.4 - 5.0 g/dL Final   08/16/2023 11:11 AM CANCELED       Comment:     Result canceled by the ancillary.   08/07/2023 07:46 AM 4.2 3.4 - 5.0 g/dL Final     No results found for: \"\"  Carcinoembryonic AG   Date/Time Value Ref Range Status   11/09/2023 03:46 PM 1.9 ug/L Final         === 08/10/23 ===    CT CHEST ABDOMEN PELVIS W IV CONTRAST    - Impression -  Colon cancer restaging scan.  1. No significant interval change in the size of scattered nodules  throughout the lungs compared to most recent chest CT, likely  reflecting a benign in etiology. No definitive evidence of metastatic  disease identified in the chest.  2. Post treatment changes are again noted in the right breast, with  minimal interval decrease in the degree of marked skin thickening  compared to CT on 02/24/2023. Recommend correlation with improving  cellulitis superimposed on chronic post radiation changes.  3. Postsurgical changes of low anterior resection with colorectal  anastomosis in the pelvis. No definitive evidence of locoregional  recurrence or metastatic disease identified in the abdomen or pelvis.  4. Additional unchanged chronic findings as detailed above.    I personally reviewed the images/study and I agree with the findings  as stated. [facility] [place], [place].      Assessment/Plan     Assessment:    1. Sigmoid Cancer  Stage IIIB s/p resection 7/2022. NICHOLAS  High risk features with 2/20 nodes and small vessel invasion   Discussed adjuvant therapy with Oxaliplatin and 5 FU.  She received ~2 cycles and had neutorpenic sepsis in ICU.  Continue ongoing surveillance for her colorectal cancer.   She also heard CT scans every 6 months are anticipated through her first 2 years.       - labs including CEA / signatera every 3 months.    - f/u in 3 mos with labs.  Consider scans at the time depending on labs and status.     -Colonoscopy negative 7/2023.  Next  Colonoscopy 7/2026     2. Breast Cancer   Remote without concern for active disease.  s/p 4 cycles of AC        3. Genetics  referral placed - she is holding off on consultation until after daughters fertility tx      4. Thyroid nodule:   - Bx upcoming with Dr. Ignacio,  F/u with Dr. Ignacio.        Javi Mulligan MD     [facility] [place]  Office: [phone]  Fax: [phone]

## 2023-11-24 ENCOUNTER — LAB (OUTPATIENT)
Dept: LAB | Facility: LAB | Age: 63
End: 2023-11-24
Payer: COMMERCIAL

## 2023-11-24 DIAGNOSIS — E89.0 STATUS POST PARTIAL THYROIDECTOMY: ICD-10-CM

## 2023-11-24 LAB
T4 FREE SERPL-MCNC: 0.81 NG/DL (ref 0.78–1.48)
TSH SERPL-ACNC: 3.84 MIU/L (ref 0.44–3.98)

## 2023-11-24 PROCEDURE — 84443 ASSAY THYROID STIM HORMONE: CPT

## 2023-11-24 PROCEDURE — 36415 COLL VENOUS BLD VENIPUNCTURE: CPT

## 2023-11-24 PROCEDURE — 84439 ASSAY OF FREE THYROXINE: CPT

## 2023-11-29 ENCOUNTER — TELEPHONE (OUTPATIENT)
Dept: SURGERY | Facility: CLINIC | Age: 63
End: 2023-11-29
Payer: COMMERCIAL

## 2023-11-29 NOTE — TELEPHONE ENCOUNTER
Call to patient. No answer. Left voicemail message notifying patient that TSH and free T4 levels are normal and she has no thyroid hormone replacement needs at this time. Patient told to follow up with PCP for continued monitoring. Callback number provided.

## 2023-12-04 ENCOUNTER — TELEPHONE (OUTPATIENT)
Dept: SURGERY | Facility: CLINIC | Age: 63
End: 2023-12-04
Payer: COMMERCIAL

## 2023-12-04 NOTE — TELEPHONE ENCOUNTER
Return call to patient with contact phone number to help with insurance/coding issues. Also provided email address for patient to send needed paperwork for Dr. Ignacio to sign. All information sent to patient via email as well. No further questions or concerns.

## 2023-12-21 ENCOUNTER — ANCILLARY PROCEDURE (OUTPATIENT)
Dept: RADIOLOGY | Facility: CLINIC | Age: 63
End: 2023-12-21
Payer: COMMERCIAL

## 2023-12-21 DIAGNOSIS — Z12.31 ENCOUNTER FOR SCREENING MAMMOGRAM FOR MALIGNANT NEOPLASM OF BREAST: ICD-10-CM

## 2023-12-21 PROCEDURE — 77063 BREAST TOMOSYNTHESIS BI: CPT | Mod: BILATERAL PROCEDURE | Performed by: RADIOLOGY

## 2023-12-21 PROCEDURE — 77067 SCR MAMMO BI INCL CAD: CPT | Mod: BILATERAL PROCEDURE | Performed by: RADIOLOGY

## 2023-12-21 PROCEDURE — 77067 SCR MAMMO BI INCL CAD: CPT

## 2024-02-09 ENCOUNTER — LAB (OUTPATIENT)
Dept: LAB | Facility: LAB | Age: 64
End: 2024-02-09
Payer: COMMERCIAL

## 2024-02-09 DIAGNOSIS — C20 RECTAL CANCER (MULTI): ICD-10-CM

## 2024-02-09 LAB
ALBUMIN SERPL BCP-MCNC: 4.1 G/DL (ref 3.4–5)
ALP SERPL-CCNC: 81 U/L (ref 33–136)
ALT SERPL W P-5'-P-CCNC: 15 U/L (ref 7–45)
ANION GAP SERPL CALC-SCNC: 13 MMOL/L (ref 10–20)
AST SERPL W P-5'-P-CCNC: 13 U/L (ref 9–39)
BASOPHILS # BLD AUTO: 0.04 X10*3/UL (ref 0–0.1)
BASOPHILS NFR BLD AUTO: 0.5 %
BILIRUB SERPL-MCNC: 0.3 MG/DL (ref 0–1.2)
BUN SERPL-MCNC: 20 MG/DL (ref 6–23)
CALCIUM SERPL-MCNC: 9.8 MG/DL (ref 8.6–10.6)
CEA SERPL-MCNC: 2 UG/L
CHLORIDE SERPL-SCNC: 106 MMOL/L (ref 98–107)
CO2 SERPL-SCNC: 28 MMOL/L (ref 21–32)
CREAT SERPL-MCNC: 0.81 MG/DL (ref 0.5–1.05)
EGFRCR SERPLBLD CKD-EPI 2021: 82 ML/MIN/1.73M*2
EOSINOPHIL # BLD AUTO: 0.1 X10*3/UL (ref 0–0.7)
EOSINOPHIL NFR BLD AUTO: 1.2 %
ERYTHROCYTE [DISTWIDTH] IN BLOOD BY AUTOMATED COUNT: 12.9 % (ref 11.5–14.5)
GLUCOSE SERPL-MCNC: 113 MG/DL (ref 74–99)
HCT VFR BLD AUTO: 45.4 % (ref 36–46)
HGB BLD-MCNC: 15.1 G/DL (ref 12–16)
IMM GRANULOCYTES # BLD AUTO: 0.02 X10*3/UL (ref 0–0.7)
IMM GRANULOCYTES NFR BLD AUTO: 0.2 % (ref 0–0.9)
LYMPHOCYTES # BLD AUTO: 2.57 X10*3/UL (ref 1.2–4.8)
LYMPHOCYTES NFR BLD AUTO: 31.3 %
MCH RBC QN AUTO: 31.1 PG (ref 26–34)
MCHC RBC AUTO-ENTMCNC: 33.3 G/DL (ref 32–36)
MCV RBC AUTO: 94 FL (ref 80–100)
MONOCYTES # BLD AUTO: 0.56 X10*3/UL (ref 0.1–1)
MONOCYTES NFR BLD AUTO: 6.8 %
NEUTROPHILS # BLD AUTO: 4.93 X10*3/UL (ref 1.2–7.7)
NEUTROPHILS NFR BLD AUTO: 60 %
NRBC BLD-RTO: 0 /100 WBCS (ref 0–0)
PLATELET # BLD AUTO: 283 X10*3/UL (ref 150–450)
POTASSIUM SERPL-SCNC: 4.7 MMOL/L (ref 3.5–5.3)
PROT SERPL-MCNC: 7 G/DL (ref 6.4–8.2)
RBC # BLD AUTO: 4.85 X10*6/UL (ref 4–5.2)
SODIUM SERPL-SCNC: 142 MMOL/L (ref 136–145)
WBC # BLD AUTO: 8.2 X10*3/UL (ref 4.4–11.3)

## 2024-02-09 PROCEDURE — 36415 COLL VENOUS BLD VENIPUNCTURE: CPT

## 2024-02-09 PROCEDURE — 80053 COMPREHEN METABOLIC PANEL: CPT

## 2024-02-09 PROCEDURE — 85025 COMPLETE CBC W/AUTO DIFF WBC: CPT

## 2024-02-09 PROCEDURE — 82378 CARCINOEMBRYONIC ANTIGEN: CPT

## 2024-02-13 ENCOUNTER — OFFICE VISIT (OUTPATIENT)
Dept: HEMATOLOGY/ONCOLOGY | Facility: CLINIC | Age: 64
End: 2024-02-13
Payer: COMMERCIAL

## 2024-02-13 VITALS
HEART RATE: 60 BPM | RESPIRATION RATE: 18 BRPM | BODY MASS INDEX: 31.33 KG/M2 | SYSTOLIC BLOOD PRESSURE: 110 MMHG | TEMPERATURE: 98.1 F | WEIGHT: 182.54 LBS | OXYGEN SATURATION: 97 % | DIASTOLIC BLOOD PRESSURE: 60 MMHG

## 2024-02-13 DIAGNOSIS — C20 RECTAL CANCER (MULTI): ICD-10-CM

## 2024-02-13 PROCEDURE — 99213 OFFICE O/P EST LOW 20 MIN: CPT | Performed by: INTERNAL MEDICINE

## 2024-02-13 PROCEDURE — 1036F TOBACCO NON-USER: CPT | Performed by: INTERNAL MEDICINE

## 2024-02-13 PROCEDURE — 3008F BODY MASS INDEX DOCD: CPT | Performed by: INTERNAL MEDICINE

## 2024-02-13 ASSESSMENT — PAIN SCALES - GENERAL: PAINLEVEL: 0-NO PAIN

## 2024-02-13 NOTE — PROGRESS NOTES
Patient here today with  for followup visit.   Reported that she is concerned about her mammogram results and history of blood clot in left arm. She is not sure who to see about it as she is no longer seeing the provider who ordered it. Inquiring about Signatera results.   Medications and allergies reviewed with patient.

## 2024-02-13 NOTE — PROGRESS NOTES
Patient ID: Iwona Reardon is a 63 y.o. female from [place], [place].     Referring Physician: Javi Mulligan MD  [id number] [place] [address]    Primary Care Provider: Gianni Pro MD    Cancer History:          Breast         AJCC Edition: 7th (AJCC), Diagnosis Date: 21-Jun-2006, IA- Right, T1b pN0 M0          Colon and Rectum         AJCC Edition: 8th (AJCC), Diagnosis Date: Jul 2022, IIIB, pT3 pN1b cM0      Treatment Synopsis:    1. Sigmoid cancer  January 2022 started having constipation and blood in stool  She was referred to GI which  was quite delayed  Colonoscopy finally done in June  She now has a confirmed upper rectosigmoid CA - treated like  a colon cancer  LAR 7/6/22  [id number]  2/20 + nodes. Small vessel invasion  NICHOLAS     2. Right-sided breast cancer.   CHARACTERISTICS: This is a 51-year-old patient presenting in July 2006 with a right-sided 0.8 cm grade 3 infiltrating ductal carcinoma with triple-negative characteristics. She has seven negative lymph nodes and two negative sentinel lymph nodes.       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 Raymond also had breast cancer. She has some paternal and maternal cousins with breast cancer as well and a sister with a thyroid  malignancy.      PRIOR TREATMENT:   1. Right lumpectomy and axillary node evaluation in July 2006.   2. Right-sided accelerated partial breast radiation in August 2006 per NSABP B-39.   3. She had four cycles of Adriamycin and Cytoxan, completed in November 2006.            Treatment History:    2022-8-9: Chemotherapy: Cycle 1 Folfox/ adjuvant  2022-11-4: Chemotherapy: adjuvant therapy aborted with sepsis and central line complications          Past Medical History: Iwona has a past medical history of Benign endometrial hyperplasia (10/06/2014), BRBPR (bright red blood per rectum)  (01/23/2023), Breast cancer (CMS/HCC), Chronic deep vein thrombosis (DVT) of axillary vein of left upper extremity (CMS/HCC) (01/23/2023), Colon cancer (CMS/HCC), GERD (gastroesophageal reflux disease) (01/23/2023), Hyperlipidemia, Infection following a procedure, other surgical site, initial encounter (12/04/2014), MSSA bacteremia (11/22/2022), Other specified diseases of intestine (07/29/2022), 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  Surgical History:  Iwona has a past surgical history that includes Other surgical history (11/18/2013); [place] lymph node biopsy (11/18/2013); Dilation and curettage of uterus (08/29/2014); Total abdominal hysterectomy w/ bilateral salpingoophorectomy (12/04/2014); Other surgical history (04/14/2014); Other surgical history (07/29/2022); Other surgical history (08/16/2022); Other surgical history (08/16/2022); Breast surgery; Colon surgery; Lymph node biopsy; Colonoscopy (06/2023); Breast biopsy; and Breast lumpectomy.  Social History:  Iwona reports that she quit smoking about 18 years ago. Her smoking use included cigarettes. She has a 35.00 pack-year smoking history. She has never used smokeless tobacco. She reports current alcohol use of about 1.0 standard drink of alcohol per week. She reports that she does not use drugs.  Family History:    Family History   Problem Relation Name Age of Onset    Other (skin conditions) Mother      Cancer Father Everardo Truong     Thyroid cancer Sister      Pancreatic cancer Maternal Grandfather      Breast cancer Mother's Sister  65     Lung cancer Mother's Sister      Other (metastasis to the brain) Mother's Sister      Uterine cancer Mother's Sister      Breast cancer Father's Sister  40     Family Oncology History:  Cancer-related family history includes Breast cancer (age of onset: 40) in her father's sister; Breast cancer (age of onset: 65) in her mother's sister; Cancer in her father; Lung cancer in her mother's sister; Pancreatic cancer in her maternal grandfather; Thyroid cancer in her sister; Uterine cancer in her mother's sister.        SUBJECTIVE:    History of Present Illness:  Iwona Reardon is a 63 y.o. female who was referred by Javi Mulligan MD and presents with Follow-up    HPI        OBJECTIVE:    VS / Pain:  /60   Pulse 60   Temp 36.7 °C (98.1 °F)   Resp 18   Wt 82.8 kg (182 lb 8.7 oz)   SpO2 97%   BMI 31.33 kg/m²   BSA: 1.93 meters squared   Pain Scale: 0    Daily Weight  02/13/24 : 82.8 kg (182 lb 8.7 oz)  11/14/23 : 85.6 kg (188 lb 11.4 oz)  11/02/23 : 83.9 kg (185 lb)      Physical Exam  Constitutional:       General: She is not in acute distress.     Appearance: She is normal weight.   HENT:      Nose: Nose normal.      Mouth/Throat:      Mouth: Mucous membranes are moist.   Eyes:      Extraocular Movements: Extraocular movements intact.      Conjunctiva/sclera: Conjunctivae normal.   Cardiovascular:      Rate and Rhythm: Normal rate and regular rhythm.      Heart sounds: No murmur heard.  Pulmonary:      Effort: Pulmonary effort is normal.   Abdominal:      General: Abdomen is flat. Bowel sounds are normal.   Musculoskeletal:         General: No swelling. Normal range of motion.      Cervical back: Normal range of motion.   Skin:     General: Skin is warm.   Neurological:      General: No focal deficit present.      Mental Status: She is alert. Mental status is at baseline.   Psychiatric:         Mood and Affect: Mood normal.         Thought Content: Thought content normal.         Performance  Status:       Diagnostic Results         WBC   Date/Time Value Ref Range Status   02/09/2024 07:24 AM 8.2 4.4 - 11.3 x10*3/uL Final   11/09/2023 03:46 PM 9.2 4.4 - 11.3 x10*3/uL Final   08/22/2023 07:04 AM 8.1 4.4 - 11.3 x10E9/L Final   08/16/2023 07:40 AM CANCELED       Comment:     Result canceled by the ancillary.   05/04/2023 04:37 PM 8.5 4.4 - 11.3 x10E9/L Final     Hemoglobin   Date Value Ref Range Status   02/09/2024 15.1 12.0 - 16.0 g/dL Final   11/09/2023 14.2 12.0 - 16.0 g/dL Final   08/22/2023 14.5 12.0 - 16.0 g/dL Final   08/16/2023 CANCELED       Comment:     Result canceled by the ancillary.   05/04/2023 13.9 12.0 - 16.0 g/dL Final     MCV   Date/Time Value Ref Range Status   02/09/2024 07:24 AM 94 80 - 100 fL Final   11/09/2023 03:46 PM 95 80 - 100 fL Final   08/22/2023 07:04 AM 94 80 - 100 fL Final   08/16/2023 07:40 AM CANCELED       Comment:     Result canceled by the ancillary.   05/04/2023 04:37 PM 91 80 - 100 fL Final     Platelets   Date/Time Value Ref Range Status   02/09/2024 07:24  150 - 450 x10*3/uL Final   11/09/2023 03:46  150 - 450 x10*3/uL Final   08/22/2023 07:04  150 - 450 x10E9/L Final   08/16/2023 07:40 AM CANCELED       Comment:     Result canceled by the ancillary.   05/04/2023 04:37  150 - 450 x10E9/L Final     Neutrophils Absolute   Date/Time Value Ref Range Status   02/09/2024 07:24 AM 4.93 1.20 - 7.70 x10*3/uL Final     Comment:     Percent differential counts (%) should be interpreted in the context of the absolute cell counts (cells/uL).   11/09/2023 03:46 PM 5.29 1.20 - 7.70 x10*3/uL Final     Comment:     Percent differential counts (%) should be interpreted in the context of the absolute cell counts (cells/uL).   08/22/2023 07:04 AM 5.04 1.20 - 7.70 x10E9/L Final   08/16/2023 07:40 AM CANCELED       Comment:      Percent differential counts (%) should be interpreted in the   context of the absolute cell counts (cells/L).    Result canceled by the  ancillary.     05/04/2023 04:37 PM 5.70 1.20 - 7.70 x10E9/L Final     Bilirubin, Total   Date/Time Value Ref Range Status   02/09/2024 07:24 AM 0.3 0.0 - 1.2 mg/dL Final   11/09/2023 03:46 PM 0.3 0.0 - 1.2 mg/dL Final     Total Bilirubin   Date/Time Value Ref Range Status   08/22/2023 07:04 AM 0.4 0.0 - 1.2 mg/dL Final   08/16/2023 11:11 AM CANCELED       Comment:     Result canceled by the ancillary.   08/07/2023 07:46 AM 0.5 0.0 - 1.2 mg/dL Final     AST   Date/Time Value Ref Range Status   02/09/2024 07:24 AM 13 9 - 39 U/L Final   11/09/2023 03:46 PM 14 9 - 39 U/L Final   08/22/2023 07:04 AM 14 9 - 39 U/L Final   08/16/2023 11:11 AM CANCELED       Comment:     Result canceled by the ancillary.   08/07/2023 07:46 AM 17 9 - 39 U/L Final     ALT   Date/Time Value Ref Range Status   02/09/2024 07:24 AM 15 7 - 45 U/L Final     Comment:     Patients treated with Sulfasalazine may generate falsely decreased results for ALT.   11/09/2023 03:46 PM 18 7 - 45 U/L Final     Comment:     Patients treated with Sulfasalazine may generate falsely decreased results for ALT.     ALT (SGPT)   Date/Time Value Ref Range Status   08/22/2023 07:04 AM 16 7 - 45 U/L Final     Comment:      Patients treated with Sulfasalazine may generate    falsely decreased results for ALT.     08/16/2023 11:11 AM CANCELED       Comment:      Patients treated with Sulfasalazine may generate    falsely decreased results for ALT.    Result canceled by the ancillary.     08/07/2023 07:46 AM 18 7 - 45 U/L Final     Comment:      Patients treated with Sulfasalazine may generate    falsely decreased results for ALT.       Creatinine   Date/Time Value Ref Range Status   02/09/2024 07:24 AM 0.81 0.50 - 1.05 mg/dL Final   11/09/2023 03:46 PM 0.69 0.50 - 1.05 mg/dL Final   08/22/2023 07:04 AM 0.74 0.50 - 1.05 mg/dL Final   08/16/2023 11:11 AM CANCELED       Comment:     Result canceled by the ancillary.   08/07/2023 07:46 AM 0.85 0.50 - 1.05 mg/dL Final     Urea  "Nitrogen   Date/Time Value Ref Range Status   02/09/2024 07:24 AM 20 6 - 23 mg/dL Final   11/09/2023 03:46 PM 23 6 - 23 mg/dL Final   08/22/2023 07:04 AM 18 6 - 23 mg/dL Final   08/16/2023 11:11 AM CANCELED       Comment:     Result canceled by the ancillary.   08/07/2023 07:46 AM 23 6 - 23 mg/dL Final     Albumin   Date/Time Value Ref Range Status   02/09/2024 07:24 AM 4.1 3.4 - 5.0 g/dL Final   11/09/2023 03:46 PM 4.6 3.4 - 5.0 g/dL Final   08/22/2023 07:04 AM 4.5 3.4 - 5.0 g/dL Final   08/16/2023 11:11 AM CANCELED       Comment:     Result canceled by the ancillary.   08/07/2023 07:46 AM 4.2 3.4 - 5.0 g/dL Final     No results found for: \"\"  Carcinoembryonic AG   Date/Time Value Ref Range Status   02/09/2024 07:24 AM 2.0 ug/L Final   11/09/2023 03:46 PM 1.9 ug/L Final         === 08/10/23 ===    CT CHEST ABDOMEN PELVIS W IV CONTRAST    - Impression -  Colon cancer restaging scan.  1. No significant interval change in the size of scattered nodules  throughout the lungs compared to most recent chest CT, likely  reflecting a benign in etiology. No definitive evidence of metastatic  disease identified in the chest.  2. Post treatment changes are again noted in the right breast, with  minimal interval decrease in the degree of marked skin thickening  compared to CT on 02/24/2023. Recommend correlation with improving  cellulitis superimposed on chronic post radiation changes.  3. Postsurgical changes of low anterior resection with colorectal  anastomosis in the pelvis. No definitive evidence of locoregional  recurrence or metastatic disease identified in the abdomen or pelvis.  4. Additional unchanged chronic findings as detailed above.    I personally reviewed the images/study and I agree with the findings  as stated. This study was interpreted at [facility], [place], [place].      Assessment/Plan     Assessment:    1. Sigmoid Cancer  Stage IIIB s/p resection 7/2022. NICHOLAS  High " risk features with 2/20 nodes and small vessel invasion   Discussed adjuvant therapy with Oxaliplatin and 5 FU.  She received ~2 cycles and had neutorpenic sepsis in ICU.  Continue ongoing surveillance for her colorectal cancer.   She also heard CT scans every 6 months are anticipated through her first 2 years.       - labs including CEA / signatera every 3 months.    - f/u in 4 mos with labs.  Consider scans at the time depending on labs and status.     -Colonoscopy negative 7/2023.  Next  Colonoscopy 7/2026     2. Breast Cancer   Remote without concern for active disease.  s/p 4 cycles of AC       3. Genetics  referral placed - she is holding off on consultation until after daughters fertility tx      4. Thyroid nodule:   - Bx upcoming with Dr. Ignacio,  F/u with Dr. Ignacio.    5. Vascular: hx of L sided DVT and sepsis:  - Check us LUE and chest.       Javi Mulligan MD     [facility]/ [facility]  Office: [phone]  Fax: [phone]

## 2024-06-01 ENCOUNTER — LAB (OUTPATIENT)
Dept: LAB | Facility: LAB | Age: 64
End: 2024-06-01
Payer: COMMERCIAL

## 2024-06-01 DIAGNOSIS — C20 RECTAL CANCER (MULTI): ICD-10-CM

## 2024-06-01 PROCEDURE — 82378 CARCINOEMBRYONIC ANTIGEN: CPT

## 2024-06-01 PROCEDURE — 36415 COLL VENOUS BLD VENIPUNCTURE: CPT

## 2024-06-01 PROCEDURE — 80053 COMPREHEN METABOLIC PANEL: CPT

## 2024-06-01 PROCEDURE — 85025 COMPLETE CBC W/AUTO DIFF WBC: CPT

## 2024-06-02 LAB
ALBUMIN SERPL BCP-MCNC: 4.2 G/DL (ref 3.4–5)
ALP SERPL-CCNC: 74 U/L (ref 33–136)
ALT SERPL W P-5'-P-CCNC: 16 U/L (ref 7–45)
ANION GAP SERPL CALC-SCNC: 14 MMOL/L (ref 10–20)
AST SERPL W P-5'-P-CCNC: 15 U/L (ref 9–39)
BASOPHILS # BLD AUTO: 0.04 X10*3/UL (ref 0–0.1)
BASOPHILS NFR BLD AUTO: 0.6 %
BILIRUB SERPL-MCNC: 0.3 MG/DL (ref 0–1.2)
BUN SERPL-MCNC: 25 MG/DL (ref 6–23)
CALCIUM SERPL-MCNC: 9.6 MG/DL (ref 8.6–10.6)
CEA SERPL-MCNC: 1.6 UG/L
CHLORIDE SERPL-SCNC: 106 MMOL/L (ref 98–107)
CO2 SERPL-SCNC: 27 MMOL/L (ref 21–32)
CREAT SERPL-MCNC: 0.73 MG/DL (ref 0.5–1.05)
EGFRCR SERPLBLD CKD-EPI 2021: >90 ML/MIN/1.73M*2
EOSINOPHIL # BLD AUTO: 0.06 X10*3/UL (ref 0–0.7)
EOSINOPHIL NFR BLD AUTO: 0.9 %
ERYTHROCYTE [DISTWIDTH] IN BLOOD BY AUTOMATED COUNT: 12.6 % (ref 11.5–14.5)
GLUCOSE SERPL-MCNC: 114 MG/DL (ref 74–99)
HCT VFR BLD AUTO: 43 % (ref 36–46)
HGB BLD-MCNC: 14.1 G/DL (ref 12–16)
IMM GRANULOCYTES # BLD AUTO: 0.02 X10*3/UL (ref 0–0.7)
IMM GRANULOCYTES NFR BLD AUTO: 0.3 % (ref 0–0.9)
LYMPHOCYTES # BLD AUTO: 1.59 X10*3/UL (ref 1.2–4.8)
LYMPHOCYTES NFR BLD AUTO: 22.7 %
MCH RBC QN AUTO: 30.3 PG (ref 26–34)
MCHC RBC AUTO-ENTMCNC: 32.8 G/DL (ref 32–36)
MCV RBC AUTO: 92 FL (ref 80–100)
MONOCYTES # BLD AUTO: 0.52 X10*3/UL (ref 0.1–1)
MONOCYTES NFR BLD AUTO: 7.4 %
NEUTROPHILS # BLD AUTO: 4.76 X10*3/UL (ref 1.2–7.7)
NEUTROPHILS NFR BLD AUTO: 68.1 %
NRBC BLD-RTO: 0 /100 WBCS (ref 0–0)
PLATELET # BLD AUTO: 254 X10*3/UL (ref 150–450)
POTASSIUM SERPL-SCNC: 4.9 MMOL/L (ref 3.5–5.3)
PROT SERPL-MCNC: 6.8 G/DL (ref 6.4–8.2)
RBC # BLD AUTO: 4.66 X10*6/UL (ref 4–5.2)
SODIUM SERPL-SCNC: 142 MMOL/L (ref 136–145)
WBC # BLD AUTO: 7 X10*3/UL (ref 4.4–11.3)

## 2024-06-03 ENCOUNTER — TELEPHONE (OUTPATIENT)
Dept: PRIMARY CARE | Facility: CLINIC | Age: 64
End: 2024-06-03
Payer: COMMERCIAL

## 2024-06-03 DIAGNOSIS — E78.2 MIXED HYPERLIPIDEMIA: Primary | ICD-10-CM

## 2024-06-03 DIAGNOSIS — Z13.89 SCREENING FOR BLOOD OR PROTEIN IN URINE: ICD-10-CM

## 2024-06-06 ENCOUNTER — HOSPITAL ENCOUNTER (OUTPATIENT)
Dept: VASCULAR MEDICINE | Facility: CLINIC | Age: 64
Discharge: HOME | End: 2024-06-06
Payer: COMMERCIAL

## 2024-06-06 DIAGNOSIS — C20 RECTAL CANCER (MULTI): ICD-10-CM

## 2024-06-06 DIAGNOSIS — M79.89 OTHER SPECIFIED SOFT TISSUE DISORDERS: ICD-10-CM

## 2024-06-06 PROCEDURE — 93971 EXTREMITY STUDY: CPT | Performed by: INTERNAL MEDICINE

## 2024-06-06 PROCEDURE — 93971 EXTREMITY STUDY: CPT

## 2024-06-11 ENCOUNTER — OFFICE VISIT (OUTPATIENT)
Dept: HEMATOLOGY/ONCOLOGY | Facility: CLINIC | Age: 64
End: 2024-06-11
Payer: COMMERCIAL

## 2024-06-11 VITALS
OXYGEN SATURATION: 96 % | SYSTOLIC BLOOD PRESSURE: 132 MMHG | BODY MASS INDEX: 31.03 KG/M2 | RESPIRATION RATE: 16 BRPM | TEMPERATURE: 97.9 F | WEIGHT: 180.8 LBS | DIASTOLIC BLOOD PRESSURE: 80 MMHG | HEART RATE: 60 BPM

## 2024-06-11 DIAGNOSIS — C19 COLORECTAL CANCER (MULTI): Primary | ICD-10-CM

## 2024-06-11 DIAGNOSIS — C20 RECTAL CANCER (MULTI): ICD-10-CM

## 2024-06-11 PROCEDURE — 3008F BODY MASS INDEX DOCD: CPT | Performed by: NURSE PRACTITIONER

## 2024-06-11 PROCEDURE — 99213 OFFICE O/P EST LOW 20 MIN: CPT | Performed by: NURSE PRACTITIONER

## 2024-06-11 ASSESSMENT — PAIN SCALES - GENERAL: PAINLEVEL: 0-NO PAIN

## 2024-06-11 NOTE — PROGRESS NOTES
Patient in clinic today for follow up visit. Patient did labs and Signatera prior to visit.  Denies any concerns today. Medications, allergies, Falls, Skin risk, nutrition assessment and treatment plan reviewed with patient.

## 2024-06-11 NOTE — PROGRESS NOTES
Patient ID: Iwona Reardon is a 63 y.o. female from [place], [place].     Referring Physician: Javi Mulligan MD  [id number] [place] [address]    Primary Care Provider: Gianni Pro MD    Cancer History:          Breast         AJCC Edition: 7th (AJCC), Diagnosis Date: 21-Jun-2006, IA- Right, T1b pN0 M0          Colon and Rectum         AJCC Edition: 8th (AJCC), Diagnosis Date: Jul 2022, IIIB, pT3 pN1b cM0      Treatment Synopsis:    1. Sigmoid cancer  January 2022 started having constipation and blood in stool  She was referred to GI which  was quite delayed  Colonoscopy finally done in June  She now has a confirmed upper rectosigmoid CA - treated like  a colon cancer  LAR 7/6/22  [id number]  2/20 + nodes. Small vessel invasion  NICHOLAS     2. Right-sided breast cancer.   CHARACTERISTICS: Presenting in July 2006 with a right-sided 0.8 cm grade 3 infiltrating ductal carcinoma with triple-negative characteristics. She has seven negative lymph nodes and two negative sentinel lymph nodes.       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 Raymond also had breast cancer. She has some paternal and maternal cousins with breast cancer as well and a sister with a thyroid  malignancy.      PRIOR TREATMENT:   1. Right lumpectomy and axillary node evaluation in July 2006.   2. Right-sided accelerated partial breast radiation in August 2006 per NSABP B-39.   3. She had four cycles of Adriamycin and Cytoxan, completed in November 2006.            Treatment History:    2022-8-9: Chemotherapy: Cycle 1 Folfox/ adjuvant  2022-11-4: Chemotherapy: adjuvant therapy aborted with sepsis and central line complications          Past Medical History: Iwona has a past medical history of Benign endometrial hyperplasia (10/06/2014), BRBPR (bright red blood per rectum) (01/23/2023), Breast cancer (Multi),  Chronic deep vein thrombosis (DVT) of axillary vein of left upper extremity (Multi) (01/23/2023), Colon cancer (Multi), GERD (gastroesophageal reflux disease) (01/23/2023), Hyperlipidemia, Infection following a procedure, other surgical site, initial encounter (12/04/2014), MSSA bacteremia (11/22/2022), Other specified diseases of intestine (07/29/2022), 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  Surgical History:  Iwona has a past surgical history that includes Other surgical history (11/18/2013); [place] lymph node biopsy (11/18/2013); Dilation and curettage of uterus (08/29/2014); Total abdominal hysterectomy w/ bilateral salpingoophorectomy (12/04/2014); Other surgical history (04/14/2014); Other surgical history (07/29/2022); Other surgical history (08/16/2022); Other surgical history (08/16/2022); Breast surgery; Colon surgery; Lymph node biopsy; Colonoscopy (06/2023); Breast biopsy; and Breast lumpectomy.  Social History:  Iwona reports that she quit smoking about 18 years ago. Her smoking use included cigarettes. She started smoking about 53 years ago. She has a 35 pack-year smoking history. She has never used smokeless tobacco. She reports current alcohol use of about 1.0 standard drink of alcohol per week. She reports that she does not use drugs.  Family History:    Family History   Problem Relation Name Age of Onset    Other (skin conditions) Mother      Cancer Father Everardo Truong     Thyroid cancer Sister      Pancreatic cancer Maternal Grandfather      Breast cancer Mother's Sister  65    Lung  cancer Mother's Sister      Other (metastasis to the brain) Mother's Sister      Uterine cancer Mother's Sister      Breast cancer Father's Sister  40     Family Oncology History:  Cancer-related family history includes Breast cancer (age of onset: 40) in her father's sister; Breast cancer (age of onset: 65) in her mother's sister; Cancer in her father; Lung cancer in her mother's sister; Pancreatic cancer in her maternal grandfather; Thyroid cancer in her sister; Uterine cancer in her mother's sister.        SUBJECTIVE:    History of Present Illness:  Iwona Reardon is a 63 y.o. female who was referred by Javi Mulligan MD and presents with No chief complaint on file.    Overall feeling well.   Using magnesium for muscles which has been helping with bowels   No abdominal pain   NO nausea   Appetite is good - wt is stable.                OBJECTIVE:    VS / Pain:  There were no vitals taken for this visit.  BSA: There is no height or weight on file to calculate BSA.   Pain Scale: 0    Daily Weight  02/13/24 : 82.8 kg (182 lb 8.7 oz)  11/14/23 : 85.6 kg (188 lb 11.4 oz)  11/02/23 : 83.9 kg (185 lb)      Physical Exam  Constitutional:       General: She is not in acute distress.     Appearance: She is normal weight.   HENT:      Nose: Nose normal.      Mouth/Throat:      Mouth: Mucous membranes are moist.   Eyes:      Extraocular Movements: Extraocular movements intact.      Conjunctiva/sclera: Conjunctivae normal.   Cardiovascular:      Rate and Rhythm: Normal rate and regular rhythm.      Heart sounds: No murmur heard.  Pulmonary:      Effort: Pulmonary effort is normal.   Abdominal:      General: Abdomen is flat. Bowel sounds are normal.   Musculoskeletal:         General: Swelling present. Normal range of motion.      Cervical back: Normal range of motion.      Comments: Residual swelling of left arm & chest    Skin:     General: Skin is warm.   Neurological:      General: No focal deficit present.       Mental Status: She is alert. Mental status is at baseline.   Psychiatric:         Mood and Affect: Mood normal.         Thought Content: Thought content normal.         Performance Status:       Diagnostic Results         WBC   Date/Time Value Ref Range Status   06/01/2024 08:25 AM 7.0 4.4 - 11.3 x10*3/uL Final   02/09/2024 07:24 AM 8.2 4.4 - 11.3 x10*3/uL Final   11/09/2023 03:46 PM 9.2 4.4 - 11.3 x10*3/uL Final     Hemoglobin   Date Value Ref Range Status   06/01/2024 14.1 12.0 - 16.0 g/dL Final   02/09/2024 15.1 12.0 - 16.0 g/dL Final   11/09/2023 14.2 12.0 - 16.0 g/dL Final     MCV   Date/Time Value Ref Range Status   06/01/2024 08:25 AM 92 80 - 100 fL Final   02/09/2024 07:24 AM 94 80 - 100 fL Final   11/09/2023 03:46 PM 95 80 - 100 fL Final     Platelets   Date/Time Value Ref Range Status   06/01/2024 08:25  150 - 450 x10*3/uL Final   02/09/2024 07:24  150 - 450 x10*3/uL Final   11/09/2023 03:46  150 - 450 x10*3/uL Final     Neutrophils Absolute   Date/Time Value Ref Range Status   06/01/2024 08:25 AM 4.76 1.20 - 7.70 x10*3/uL Final     Comment:     Percent differential counts (%) should be interpreted in the context of the absolute cell counts (cells/uL).   02/09/2024 07:24 AM 4.93 1.20 - 7.70 x10*3/uL Final     Comment:     Percent differential counts (%) should be interpreted in the context of the absolute cell counts (cells/uL).   11/09/2023 03:46 PM 5.29 1.20 - 7.70 x10*3/uL Final     Comment:     Percent differential counts (%) should be interpreted in the context of the absolute cell counts (cells/uL).     Bilirubin, Total   Date/Time Value Ref Range Status   06/01/2024 08:25 AM 0.3 0.0 - 1.2 mg/dL Final   02/09/2024 07:24 AM 0.3 0.0 - 1.2 mg/dL Final   11/09/2023 03:46 PM 0.3 0.0 - 1.2 mg/dL Final     AST   Date/Time Value Ref Range Status   06/01/2024 08:25 AM 15 9 - 39 U/L Final   02/09/2024 07:24 AM 13 9 - 39 U/L Final   11/09/2023 03:46 PM 14 9 - 39 U/L Final     ALT   Date/Time  "Value Ref Range Status   06/01/2024 08:25 AM 16 7 - 45 U/L Final     Comment:     Patients treated with Sulfasalazine may generate falsely decreased results for ALT.   02/09/2024 07:24 AM 15 7 - 45 U/L Final     Comment:     Patients treated with Sulfasalazine may generate falsely decreased results for ALT.   11/09/2023 03:46 PM 18 7 - 45 U/L Final     Comment:     Patients treated with Sulfasalazine may generate falsely decreased results for ALT.     Creatinine   Date/Time Value Ref Range Status   06/01/2024 08:25 AM 0.73 0.50 - 1.05 mg/dL Final   02/09/2024 07:24 AM 0.81 0.50 - 1.05 mg/dL Final   11/09/2023 03:46 PM 0.69 0.50 - 1.05 mg/dL Final     Urea Nitrogen   Date/Time Value Ref Range Status   06/01/2024 08:25 AM 25 (H) 6 - 23 mg/dL Final   02/09/2024 07:24 AM 20 6 - 23 mg/dL Final   11/09/2023 03:46 PM 23 6 - 23 mg/dL Final     Albumin   Date/Time Value Ref Range Status   06/01/2024 08:25 AM 4.2 3.4 - 5.0 g/dL Final   02/09/2024 07:24 AM 4.1 3.4 - 5.0 g/dL Final   11/09/2023 03:46 PM 4.6 3.4 - 5.0 g/dL Final     No results found for: \"\"  Carcinoembryonic AG   Date/Time Value Ref Range Status   06/01/2024 08:25 AM 1.6 ug/L Final   02/09/2024 07:24 AM 2.0 ug/L Final   11/09/2023 03:46 PM 1.9 ug/L Final     RUE US - 6/624  CONCLUSIONS:  Right Upper Venous: The subclavian vein demonstrates a normal spontaneous and phasic flow.  Left Upper Venous: No evidence of acute deep vein thrombus visualized in the left upper extremity.     SIGNATERA _ negative x 7 - last May 2024     Assessment/Plan     Assessment:    1. Sigmoid Cancer  Stage IIIB s/p resection 7/2022. NICHOLAS  High risk features with 2/20 nodes and small vessel invasion   Discussed adjuvant therapy with Oxaliplatin and 5 FU.  She received ~2 cycles and had neutorpenic sepsis in ICU.  Continue ongoing surveillance for her colorectal cancer.           Now 2 yrs out from colon cancer surgery - labs unremarkable, CEA normal / Signatera negative   Annual CT " due August 2024    Plan for CT in August   - follow up with Dr. Mulligan after to review results     -Colonoscopy negative 7/2023.  Next  Colonoscopy 7/2026     2. Breast Cancer    - now 18 yrs out from diagnosis   s/p 4 cycles of AC - was seeing Hue Abbasi, now following with PCP.            3. Genetics  referral placed - she is holding off on consultation until after daughters fertility tx      4. Thyroid nodule: Nov 2023 undeerwent left thyroid lobectomy with Dr. Guajardo.  1.6 cm classic variant of papillary thyroid cancer. There was 1 lymph node removed with the specimen that was negative for cancer. There was no extrathyroidal extension and no vascular invasion.        5. Vascular: hx of L sided DVT and sepsis: some residual swelling of left arm. Chest   - Check us LUE and chest.   - Negative June 2024    Swelling due to collaterals.      RAMYA [facility]/ [facility]  Office: [phone]  Fax: [phone]

## 2024-06-15 ENCOUNTER — LAB (OUTPATIENT)
Dept: LAB | Facility: LAB | Age: 64
End: 2024-06-15
Payer: COMMERCIAL

## 2024-06-15 DIAGNOSIS — Z13.89 SCREENING FOR BLOOD OR PROTEIN IN URINE: ICD-10-CM

## 2024-06-15 DIAGNOSIS — E78.2 MIXED HYPERLIPIDEMIA: ICD-10-CM

## 2024-06-15 PROCEDURE — 36415 COLL VENOUS BLD VENIPUNCTURE: CPT

## 2024-06-15 PROCEDURE — 87086 URINE CULTURE/COLONY COUNT: CPT

## 2024-06-15 PROCEDURE — 80061 LIPID PANEL: CPT

## 2024-06-15 PROCEDURE — 81001 URINALYSIS AUTO W/SCOPE: CPT

## 2024-06-15 ASSESSMENT — PROMIS GLOBAL HEALTH SCALE
RATE_SOCIAL_SATISFACTION: EXCELLENT
EMOTIONAL_PROBLEMS: NEVER
RATE_MENTAL_HEALTH: EXCELLENT
CARRYOUT_PHYSICAL_ACTIVITIES: COMPLETELY
RATE_AVERAGE_PAIN: 0
RATE_GENERAL_HEALTH: VERY GOOD
RATE_QUALITY_OF_LIFE: VERY GOOD
RATE_PHYSICAL_HEALTH: VERY GOOD
CARRYOUT_SOCIAL_ACTIVITIES: EXCELLENT

## 2024-06-16 LAB
APPEARANCE UR: ABNORMAL
BACTERIA #/AREA URNS AUTO: ABNORMAL /HPF
BILIRUB UR STRIP.AUTO-MCNC: NEGATIVE MG/DL
CHOLEST SERPL-MCNC: 192 MG/DL (ref 0–199)
CHOLESTEROL/HDL RATIO: 4.4
COLOR UR: ABNORMAL
GLUCOSE UR STRIP.AUTO-MCNC: NORMAL MG/DL
HDLC SERPL-MCNC: 43.4 MG/DL
HOLD SPECIMEN: NORMAL
KETONES UR STRIP.AUTO-MCNC: NEGATIVE MG/DL
LDLC SERPL CALC-MCNC: 129 MG/DL
LEUKOCYTE ESTERASE UR QL STRIP.AUTO: ABNORMAL
NITRITE UR QL STRIP.AUTO: NEGATIVE
NON HDL CHOLESTEROL: 149 MG/DL (ref 0–149)
PH UR STRIP.AUTO: 5.5 [PH]
PROT UR STRIP.AUTO-MCNC: NEGATIVE MG/DL
RBC # UR STRIP.AUTO: NEGATIVE /UL
RBC #/AREA URNS AUTO: ABNORMAL /HPF
SP GR UR STRIP.AUTO: 1.02
SQUAMOUS #/AREA URNS AUTO: ABNORMAL /HPF
TRIGL SERPL-MCNC: 99 MG/DL (ref 0–149)
UROBILINOGEN UR STRIP.AUTO-MCNC: NORMAL MG/DL
VLDL: 20 MG/DL (ref 0–40)
WBC #/AREA URNS AUTO: ABNORMAL /HPF

## 2024-06-17 LAB — BACTERIA UR CULT: NORMAL

## 2024-06-20 ENCOUNTER — APPOINTMENT (OUTPATIENT)
Dept: PRIMARY CARE | Facility: CLINIC | Age: 64
End: 2024-06-20
Payer: COMMERCIAL

## 2024-06-20 VITALS
DIASTOLIC BLOOD PRESSURE: 82 MMHG | BODY MASS INDEX: 31.76 KG/M2 | WEIGHT: 186 LBS | HEIGHT: 64 IN | OXYGEN SATURATION: 98 % | HEART RATE: 76 BPM | RESPIRATION RATE: 12 BRPM | SYSTOLIC BLOOD PRESSURE: 124 MMHG

## 2024-06-20 DIAGNOSIS — Z12.31 ENCOUNTER FOR SCREENING MAMMOGRAM FOR MALIGNANT NEOPLASM OF BREAST: ICD-10-CM

## 2024-06-20 DIAGNOSIS — E78.2 MIXED HYPERLIPIDEMIA: ICD-10-CM

## 2024-06-20 DIAGNOSIS — Z85.3 PERSONAL HISTORY OF BREAST CANCER: ICD-10-CM

## 2024-06-20 DIAGNOSIS — C20 RECTAL CANCER (MULTI): ICD-10-CM

## 2024-06-20 DIAGNOSIS — Z00.00 HEALTHCARE MAINTENANCE: Primary | ICD-10-CM

## 2024-06-20 PROCEDURE — 99396 PREV VISIT EST AGE 40-64: CPT | Performed by: FAMILY MEDICINE

## 2024-06-20 PROCEDURE — 1036F TOBACCO NON-USER: CPT | Performed by: FAMILY MEDICINE

## 2024-06-20 ASSESSMENT — ENCOUNTER SYMPTOMS
NERVOUS/ANXIOUS: 0
DYSURIA: 0
SINUS PRESSURE: 0
ADENOPATHY: 0
CHEST TIGHTNESS: 0
SLEEP DISTURBANCE: 0
DIFFICULTY URINATING: 0
MYALGIAS: 0
WHEEZING: 0
DYSPHORIC MOOD: 0
ABDOMINAL PAIN: 0
SHORTNESS OF BREATH: 0
BRUISES/BLEEDS EASILY: 0
FEVER: 0
CHILLS: 0
FATIGUE: 0
LIGHT-HEADEDNESS: 0
DIARRHEA: 0
HEADACHES: 0
APPETITE CHANGE: 0
ARTHRALGIAS: 0
NAUSEA: 0
ABDOMINAL DISTENTION: 0

## 2024-06-20 NOTE — PROGRESS NOTES
"Subjective   Patient ID: Iwona Reardon is a 63 y.o. female who presents for Annual Exam.    HPI     Review of Systems    Objective   /82   Pulse 76   Resp 12   Ht 1.626 m (5' 4\")   Wt 84.4 kg (186 lb)   SpO2 98%   BMI 31.93 kg/m²     Physical Exam    Assessment/Plan          "

## 2024-06-20 NOTE — PROGRESS NOTES
"Subjective   Patient ID: Iwona Reardon is a 63 y.o. female who presents for Annual Exam.  PMHX, PSHx, Fam hx, and Social hx reviewed.   New concerns none  Vaccines - she declines Shingrix  Dentist seen at least yearly no  Vision concerns none  Hearing concerns none  Diet is overall healthy.   Smoker - no  Lung CT/screening - NA  AAA screening - NA  Alcohol use - 0-2 drinks per month  Exercising 2-3 days per week.   Colonoscopy 2023, next due in 3yrs  Mamm - 2023, current per Dr Mulligan             Review of Systems   Constitutional:  Negative for appetite change, chills, fatigue and fever.   HENT:  Negative for congestion, ear pain and sinus pressure.    Eyes:  Negative for visual disturbance.   Respiratory:  Negative for chest tightness, shortness of breath and wheezing.    Cardiovascular:  Negative for chest pain.   Gastrointestinal:  Negative for abdominal distention, abdominal pain, diarrhea and nausea.   Genitourinary:  Negative for difficulty urinating, dysuria and pelvic pain.   Musculoskeletal:  Negative for arthralgias and myalgias.   Skin:  Negative for rash.   Allergic/Immunologic: Negative for immunocompromised state.   Neurological:  Negative for light-headedness and headaches.   Hematological:  Negative for adenopathy. Does not bruise/bleed easily.   Psychiatric/Behavioral:  Negative for dysphoric mood and sleep disturbance. The patient is not nervous/anxious.        Objective   /82   Pulse 76   Resp 12   Ht 1.626 m (5' 4\")   Wt 84.4 kg (186 lb)   SpO2 98%   BMI 31.93 kg/m²    Physical Exam  Constitutional:       General: She is not in acute distress.     Appearance: Normal appearance. She is not ill-appearing.   HENT:      Head: Normocephalic and atraumatic.      Right Ear: Tympanic membrane, ear canal and external ear normal.      Left Ear: Tympanic membrane, ear canal and external ear normal.      Nose: Nose normal.      Mouth/Throat:      Mouth: Mucous membranes are moist.      " Pharynx: No oropharyngeal exudate or posterior oropharyngeal erythema.   Eyes:      Extraocular Movements: Extraocular movements intact.      Conjunctiva/sclera: Conjunctivae normal.      Pupils: Pupils are equal, round, and reactive to light.   Neck:      Thyroid: No thyroid mass or thyromegaly.      Vascular: No carotid bruit.   Cardiovascular:      Rate and Rhythm: Normal rate and regular rhythm.      Heart sounds: Normal heart sounds. No murmur heard.  Pulmonary:      Breath sounds: Normal breath sounds. No wheezing, rhonchi or rales.   Abdominal:      General: Bowel sounds are normal. There is no distension.      Palpations: Abdomen is soft. There is no mass.      Tenderness: There is no abdominal tenderness.   Musculoskeletal:         General: No swelling or deformity.      Cervical back: Neck supple. No tenderness.   Lymphadenopathy:      Cervical: No cervical adenopathy.   Skin:     General: Skin is warm and dry.      Findings: No lesion or rash.   Neurological:      Mental Status: She is alert and oriented to person, place, and time.      Sensory: No sensory deficit.      Motor: No weakness.      Coordination: Coordination normal.      Deep Tendon Reflexes: Reflexes normal.   Psychiatric:         Mood and Affect: Mood normal.         Behavior: Behavior normal.         Judgment: Judgment normal.           Assessment/Plan   Diagnoses and all orders for this visit:  Healthcare maintenance - Shingles vaccines recommended, other vaccines  current. Labs reviewed and discussed. Colonoscopy current. Mammogram current, ordered for December.  Mixed hyperlipidemia- - continue low cholesterol diet and regular exercise. We will continue to monitor with routine labs   Hx Rectal/ Breast cancer - monitoring with Dr Mulligan. Discussed that she should be getting clinical breast exam.     Follow up in 1 year, 30min for physical

## 2024-08-09 ENCOUNTER — TELEPHONE (OUTPATIENT)
Dept: HEMATOLOGY/ONCOLOGY | Facility: CLINIC | Age: 64
End: 2024-08-09
Payer: COMMERCIAL

## 2024-08-09 DIAGNOSIS — C20 RECTAL CANCER (MULTI): Primary | ICD-10-CM

## 2024-08-09 NOTE — TELEPHONE ENCOUNTER
RN called patient to follow up on inquiry of upcoming lab testing. Left message advising patient of a signatera kit request to be mailed to patient to take to a  lab per JHONATAN Meyer. Patient did not answer the call, RN left voicemail with office call back number with any additional questions.

## 2024-08-19 ENCOUNTER — DOCUMENTATION (OUTPATIENT)
Dept: HEMATOLOGY/ONCOLOGY | Facility: HOSPITAL | Age: 64
End: 2024-08-19

## 2024-08-19 ENCOUNTER — LAB (OUTPATIENT)
Dept: LAB | Facility: LAB | Age: 64
End: 2024-08-19
Payer: COMMERCIAL

## 2024-08-19 NOTE — PROGRESS NOTES
Critical lab received from [facility]. Canceled Cr and GFR. Cancelled due to improperly preserved. Dr. Mulligan notified.

## 2024-08-20 ENCOUNTER — LAB (OUTPATIENT)
Dept: LAB | Facility: LAB | Age: 64
End: 2024-08-20
Payer: COMMERCIAL

## 2024-08-20 ENCOUNTER — TELEPHONE (OUTPATIENT)
Dept: HEMATOLOGY/ONCOLOGY | Facility: HOSPITAL | Age: 64
End: 2024-08-20
Payer: COMMERCIAL

## 2024-08-20 ENCOUNTER — TELEPHONE (OUTPATIENT)
Dept: HEMATOLOGY/ONCOLOGY | Facility: HOSPITAL | Age: 64
End: 2024-08-20

## 2024-08-20 DIAGNOSIS — C20 RECTAL CANCER (MULTI): ICD-10-CM

## 2024-08-20 LAB
ALBUMIN SERPL BCP-MCNC: 4.2 G/DL (ref 3.4–5)
ALP SERPL-CCNC: 78 U/L (ref 33–136)
ALT SERPL W P-5'-P-CCNC: 15 U/L (ref 7–45)
ANION GAP SERPL CALC-SCNC: 18 MMOL/L (ref 10–20)
AST SERPL W P-5'-P-CCNC: 17 U/L (ref 9–39)
BASOPHILS # BLD AUTO: 0.05 X10*3/UL (ref 0–0.1)
BASOPHILS NFR BLD AUTO: 0.6 %
BILIRUB SERPL-MCNC: 0.3 MG/DL (ref 0–1.2)
BUN SERPL-MCNC: 23 MG/DL (ref 6–23)
CALCIUM SERPL-MCNC: 9.3 MG/DL (ref 8.6–10.6)
CHLORIDE SERPL-SCNC: 106 MMOL/L (ref 98–107)
CO2 SERPL-SCNC: 24 MMOL/L (ref 21–32)
CREAT SERPL-MCNC: 1.03 MG/DL (ref 0.5–1.05)
EGFRCR SERPLBLD CKD-EPI 2021: 61 ML/MIN/1.73M*2
EOSINOPHIL # BLD AUTO: 0.1 X10*3/UL (ref 0–0.7)
EOSINOPHIL NFR BLD AUTO: 1.1 %
ERYTHROCYTE [DISTWIDTH] IN BLOOD BY AUTOMATED COUNT: 12.7 % (ref 11.5–14.5)
GLUCOSE SERPL-MCNC: 113 MG/DL (ref 74–99)
HCT VFR BLD AUTO: 44.5 % (ref 36–46)
HGB BLD-MCNC: 14.4 G/DL (ref 12–16)
IMM GRANULOCYTES # BLD AUTO: 0.02 X10*3/UL (ref 0–0.7)
IMM GRANULOCYTES NFR BLD AUTO: 0.2 % (ref 0–0.9)
LYMPHOCYTES # BLD AUTO: 2.77 X10*3/UL (ref 1.2–4.8)
LYMPHOCYTES NFR BLD AUTO: 31.7 %
MCH RBC QN AUTO: 30 PG (ref 26–34)
MCHC RBC AUTO-ENTMCNC: 32.4 G/DL (ref 32–36)
MCV RBC AUTO: 93 FL (ref 80–100)
MONOCYTES # BLD AUTO: 0.66 X10*3/UL (ref 0.1–1)
MONOCYTES NFR BLD AUTO: 7.5 %
NEUTROPHILS # BLD AUTO: 5.15 X10*3/UL (ref 1.2–7.7)
NEUTROPHILS NFR BLD AUTO: 58.9 %
NRBC BLD-RTO: 0 /100 WBCS (ref 0–0)
PLATELET # BLD AUTO: 268 X10*3/UL (ref 150–450)
POTASSIUM SERPL-SCNC: 4.9 MMOL/L (ref 3.5–5.3)
PROT SERPL-MCNC: 7.1 G/DL (ref 6.4–8.2)
RBC # BLD AUTO: 4.8 X10*6/UL (ref 4–5.2)
SODIUM SERPL-SCNC: 143 MMOL/L (ref 136–145)
WBC # BLD AUTO: 8.8 X10*3/UL (ref 4.4–11.3)

## 2024-08-20 PROCEDURE — 36415 COLL VENOUS BLD VENIPUNCTURE: CPT

## 2024-08-20 PROCEDURE — 85025 COMPLETE CBC W/AUTO DIFF WBC: CPT

## 2024-08-20 PROCEDURE — 80053 COMPREHEN METABOLIC PANEL: CPT

## 2024-08-20 NOTE — TELEPHONE ENCOUNTER
Labs were put in by nurse partner    Call to patient to let her know labs were ordered and she can go get them done.    She is agreeable and will go get them done now.

## 2024-08-20 NOTE — TELEPHONE ENCOUNTER
Patient is calling and went to [place] [facility] yesterday to have her lab work done.      They called her today and said they cancelled it because it was point of care testing.    I do not see any current lab orders and she needs them prior to her CT tomorrow.      Can someone please order?    Please advise    Message sent

## 2024-08-21 ENCOUNTER — HOSPITAL ENCOUNTER (OUTPATIENT)
Dept: RADIOLOGY | Facility: CLINIC | Age: 64
Discharge: HOME | End: 2024-08-21
Payer: COMMERCIAL

## 2024-08-21 DIAGNOSIS — C19 COLORECTAL CANCER (MULTI): ICD-10-CM

## 2024-08-21 PROCEDURE — 74177 CT ABD & PELVIS W/CONTRAST: CPT | Performed by: STUDENT IN AN ORGANIZED HEALTH CARE EDUCATION/TRAINING PROGRAM

## 2024-08-21 PROCEDURE — 71260 CT THORAX DX C+: CPT | Performed by: STUDENT IN AN ORGANIZED HEALTH CARE EDUCATION/TRAINING PROGRAM

## 2024-08-21 PROCEDURE — 74177 CT ABD & PELVIS W/CONTRAST: CPT

## 2024-08-21 PROCEDURE — 2550000001 HC RX 255 CONTRASTS: Performed by: NURSE PRACTITIONER

## 2024-08-27 ENCOUNTER — TELEMEDICINE (OUTPATIENT)
Dept: HEMATOLOGY/ONCOLOGY | Facility: CLINIC | Age: 64
End: 2024-08-27
Payer: COMMERCIAL

## 2024-08-27 DIAGNOSIS — C20 RECTAL CANCER (MULTI): ICD-10-CM

## 2024-08-27 DIAGNOSIS — C19 COLORECTAL CANCER (MULTI): ICD-10-CM

## 2024-08-27 PROCEDURE — 99213 OFFICE O/P EST LOW 20 MIN: CPT | Performed by: INTERNAL MEDICINE

## 2024-08-27 NOTE — PROGRESS NOTES
Patient ID: Iwona Reardon is a 64 y.o. female from [place], [place].     Referring Physician: Betsy Chambers, APRN-CNP  [address] [place] [address]    Primary Care Provider: Gianni Pro MD    Cancer History:          Breast         AJCC Edition: 7th (AJCC), Diagnosis Date: 21-Jun-2006, IA- Right, T1b pN0 M0          Colon and Rectum         AJCC Edition: 8th (AJCC), Diagnosis Date: Jul 2022, IIIB, pT3 pN1b cM0      Treatment Synopsis:    1. Sigmoid cancer  January 2022 started having constipation and blood in stool  She was referred to GI which  was quite delayed  Colonoscopy finally done in June  She now has a confirmed upper rectosigmoid CA - treated like  a colon cancer  LAR 7/6/22  [id number]  2/20 + nodes. Small vessel invasion  NICHOLAS     2. Right-sided breast cancer.   CHARACTERISTICS: Presenting in July 2006 with a right-sided 0.8 cm grade 3 infiltrating ductal carcinoma with triple-negative characteristics. She has seven negative lymph nodes and two negative sentinel lymph nodes.     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 Raymond also had breast cancer. She has some paternal and maternal cousins with breast cancer as well and a sister with a thyroid  malignancy.      PRIOR TREATMENT:   1. Right lumpectomy and axillary node evaluation in July 2006.   2. Right-sided accelerated partial breast radiation in August 2006 per NSABP B-39.   3. She had four cycles of Adriamycin and Cytoxan, completed in November 2006.            Treatment History:    2022-8-9: Chemotherapy: Cycle 1 Folfox/ adjuvant  2022-11-4: Chemotherapy: adjuvant therapy aborted with sepsis and central line complications          Past Medical History: Iwona has a past medical history of Benign endometrial hyperplasia (10/06/2014), BRBPR (bright red blood per rectum) (01/23/2023), Breast cancer  (Multi), Chronic deep vein thrombosis (DVT) of axillary vein of left upper extremity (Multi) (01/23/2023), Colon cancer (Multi), Disease of thyroid gland (07/2022), GERD (gastroesophageal reflux disease) (01/23/2023), Hyperlipidemia, Infection following a procedure, other surgical site, initial encounter (12/04/2014), MSSA bacteremia (11/22/2022), Other specified diseases of intestine (07/29/2022), 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  Surgical History:  Iwona has a past surgical history that includes Other surgical history (11/18/2013); [place] lymph node biopsy (11/18/2013); Dilation and curettage of uterus (08/29/2014); Total abdominal hysterectomy w/ bilateral salpingoophorectomy (12/04/2014); Other surgical history (04/14/2014); Other surgical history (07/29/2022); Other surgical history (08/16/2022); Other surgical history (08/16/2022); Breast surgery; Colon surgery; Lymph node biopsy; Colonoscopy (06/2023); Breast biopsy; and Breast lumpectomy.  Social History:  Iwona reports that she quit smoking about 18 years ago. Her smoking use included cigarettes. She started smoking about 53 years ago. She has a 35 pack-year smoking history. She has never used smokeless tobacco. She reports current alcohol use of about 1.0 standard drink of alcohol per week. She reports that she does not use drugs.  Family History:    Family History   Problem Relation Name Age of Onset    Other (skin conditions) Mother      Cancer Father Everardo Truong     Thyroid cancer Sister Анна Hammonds     Cancer Sister Анна  Cassius     Pancreatic cancer Maternal Grandfather      Breast cancer Mother's Sister  65    Lung cancer Mother's Sister      Other (metastasis to the brain) Mother's Sister      Uterine cancer Mother's Sister      Breast cancer Father's Sister  40    Cancer Brother Akil Truong      Family Oncology History:  Cancer-related family history includes Breast cancer (age of onset: 40) in her father's sister; Breast cancer (age of onset: 65) in her mother's sister; Cancer in her brother, father, and sister; Lung cancer in her mother's sister; Pancreatic cancer in her maternal grandfather; Thyroid cancer in her sister; Uterine cancer in her mother's sister.    SUBJECTIVE:    History of Present Illness:  Iwona Reardon is a 64 y.o. female who was referred by Betsy Chambers A* and presents with No chief complaint on file.    Overall feeling well.   Using magnesium for muscles which has been helping with bowels   No abdominal pain   NO nausea   Appetite is good - wt is stable.        OBJECTIVE:    VS / Pain:  There were no vitals taken for this visit.  BSA: There is no height or weight on file to calculate BSA.   Pain Scale: 0    Daily Weight  06/20/24 : 84.4 kg (186 lb)  06/11/24 : 82 kg (180 lb 12.8 oz)  02/13/24 : 82.8 kg (182 lb 8.7 oz)      Physical Exam  Constitutional:       General: She is not in acute distress.     Appearance: She is normal weight.   HENT:      Nose: Nose normal.      Mouth/Throat:      Mouth: Mucous membranes are moist.   Eyes:      Extraocular Movements: Extraocular movements intact.      Conjunctiva/sclera: Conjunctivae normal.   Cardiovascular:      Rate and Rhythm: Normal rate and regular rhythm.      Heart sounds: No murmur heard.  Pulmonary:      Effort: Pulmonary effort is normal.   Abdominal:      General: Abdomen is flat. Bowel sounds are normal.   Musculoskeletal:         General: Swelling present. Normal range of motion.      Cervical back: Normal range of motion.       Comments: Residual swelling of left arm & chest    Skin:     General: Skin is warm.   Neurological:      General: No focal deficit present.      Mental Status: She is alert. Mental status is at baseline.   Psychiatric:         Mood and Affect: Mood normal.         Thought Content: Thought content normal.         Performance Status:       Diagnostic Results         WBC   Date/Time Value Ref Range Status   08/20/2024 01:09 PM 8.8 4.4 - 11.3 x10*3/uL Final   06/01/2024 08:25 AM 7.0 4.4 - 11.3 x10*3/uL Final   02/09/2024 07:24 AM 8.2 4.4 - 11.3 x10*3/uL Final     Hemoglobin   Date Value Ref Range Status   08/20/2024 14.4 12.0 - 16.0 g/dL Final   06/01/2024 14.1 12.0 - 16.0 g/dL Final   02/09/2024 15.1 12.0 - 16.0 g/dL Final     MCV   Date/Time Value Ref Range Status   08/20/2024 01:09 PM 93 80 - 100 fL Final   06/01/2024 08:25 AM 92 80 - 100 fL Final   02/09/2024 07:24 AM 94 80 - 100 fL Final     Platelets   Date/Time Value Ref Range Status   08/20/2024 01:09  150 - 450 x10*3/uL Final   06/01/2024 08:25  150 - 450 x10*3/uL Final   02/09/2024 07:24  150 - 450 x10*3/uL Final     Neutrophils Absolute   Date/Time Value Ref Range Status   08/20/2024 01:09 PM 5.15 1.20 - 7.70 x10*3/uL Final     Comment:     Percent differential counts (%) should be interpreted in the context of the absolute cell counts (cells/uL).   06/01/2024 08:25 AM 4.76 1.20 - 7.70 x10*3/uL Final     Comment:     Percent differential counts (%) should be interpreted in the context of the absolute cell counts (cells/uL).   02/09/2024 07:24 AM 4.93 1.20 - 7.70 x10*3/uL Final     Comment:     Percent differential counts (%) should be interpreted in the context of the absolute cell counts (cells/uL).     Bilirubin, Total   Date/Time Value Ref Range Status   08/20/2024 01:09 PM 0.3 0.0 - 1.2 mg/dL Final   06/01/2024 08:25 AM 0.3 0.0 - 1.2 mg/dL Final   02/09/2024 07:24 AM 0.3 0.0 - 1.2 mg/dL Final     AST   Date/Time Value Ref Range Status  "  08/20/2024 01:09 PM 17 9 - 39 U/L Final   06/01/2024 08:25 AM 15 9 - 39 U/L Final   02/09/2024 07:24 AM 13 9 - 39 U/L Final     ALT   Date/Time Value Ref Range Status   08/20/2024 01:09 PM 15 7 - 45 U/L Final     Comment:     Patients treated with Sulfasalazine may generate falsely decreased results for ALT.   06/01/2024 08:25 AM 16 7 - 45 U/L Final     Comment:     Patients treated with Sulfasalazine may generate falsely decreased results for ALT.   02/09/2024 07:24 AM 15 7 - 45 U/L Final     Comment:     Patients treated with Sulfasalazine may generate falsely decreased results for ALT.     Creatinine   Date/Time Value Ref Range Status   08/20/2024 01:09 PM 1.03 0.50 - 1.05 mg/dL Final   06/01/2024 08:25 AM 0.73 0.50 - 1.05 mg/dL Final   02/09/2024 07:24 AM 0.81 0.50 - 1.05 mg/dL Final     Urea Nitrogen   Date/Time Value Ref Range Status   08/20/2024 01:09 PM 23 6 - 23 mg/dL Final   06/01/2024 08:25 AM 25 (H) 6 - 23 mg/dL Final   02/09/2024 07:24 AM 20 6 - 23 mg/dL Final     Albumin   Date/Time Value Ref Range Status   08/20/2024 01:09 PM 4.2 3.4 - 5.0 g/dL Final   06/01/2024 08:25 AM 4.2 3.4 - 5.0 g/dL Final   02/09/2024 07:24 AM 4.1 3.4 - 5.0 g/dL Final     No results found for: \"\"  Carcinoembryonic AG   Date/Time Value Ref Range Status   06/01/2024 08:25 AM 1.6 ug/L Final   02/09/2024 07:24 AM 2.0 ug/L Final   11/09/2023 03:46 PM 1.9 ug/L Final     RUE  - 6/624  CONCLUSIONS:  Right Upper Venous: The subclavian vein demonstrates a normal spontaneous and phasic flow.  Left Upper Venous: No evidence of acute deep vein thrombus visualized in the left upper extremity.     === 08/21/24 ===    CT CHEST ABDOMEN PELVIS W IV CONTRAST    - Impression -  CHEST:  1. Stable bilateral subcentimeter benign-looking pulmonary nodules. No new suspicious pulmonary nodules or masses.  2. Stable right upper lobe subpleural postradiation changes.  3. No new enlarged intrathoracic lymphadenopathy.  4. Stable appearance of the " right breast.    ABDOMEN-PELVIS:  1. Status post prior low anterior resection with no locoregional recurrence or metastatic disease in the abdomen or pelvis.    Signed by: Thony Almaraz 8/22/2024 11:34 AM  Dictation workstation:   [id number]    SIGNATERA _ negative x 8 - last Aug 2024     Assessment/Plan     Assessment:    1. Sigmoid Cancer  Stage IIIB s/p resection 7/2022. NICHOLAS  High risk features with 2/20 nodes and small vessel invasion.  Discussed adjuvant therapy with Oxaliplatin and 5 FU.  She received ~2 cycles and had neutorpenic sepsis in ICU so chemo discontinued thereafter.    Continue ongoing surveillance for her colorectal cancer.           Now >2 yrs out from colon cancer surgery - labs unremarkable, CEA normal / Signatera negative   Annual CT due August 2025  - continue follow up every 4 months for until 2026 then move to every 6 months.     -Colonoscopy negative 7/2023.  Next  Colonoscopy 7/2026     2. Breast Cancer    - now 18 yrs out from diagnosis   s/p 4 cycles of AC - was seeing Hue Abbasi, now following with PCP.            3. Genetics  referral placed - she is holding off on consultation until after daughters fertility tx      4. Thyroid nodule: Nov 2023 undeerwent left thyroid lobectomy with Dr. Guajardo.  1.6 cm classic variant of papillary thyroid cancer. There was 1 lymph node removed with the specimen that was negative for cancer. There was no extrathyroidal extension and no vascular invasion.      5. Vascular: hx of L sided DVT and sepsis: some residual swelling of left arm. Chest   - Check us LUE and chest.   - Negative June 2024    Swelling due to collaterals.      Javi Mulligan MD  [facility]/ [facility]  Office: [phone]  Fax: [phone]

## 2024-12-19 ENCOUNTER — TELEPHONE (OUTPATIENT)
Dept: SURGICAL ONCOLOGY | Facility: CLINIC | Age: 64
End: 2024-12-19
Payer: COMMERCIAL

## 2024-12-20 ENCOUNTER — LAB (OUTPATIENT)
Dept: LAB | Facility: LAB | Age: 64
End: 2024-12-20
Payer: COMMERCIAL

## 2024-12-20 DIAGNOSIS — C20 RECTAL CANCER (MULTI): ICD-10-CM

## 2024-12-20 LAB
ALBUMIN SERPL BCP-MCNC: 4.2 G/DL (ref 3.4–5)
ALP SERPL-CCNC: 75 U/L (ref 33–136)
ALT SERPL W P-5'-P-CCNC: 15 U/L (ref 7–45)
ANION GAP SERPL CALC-SCNC: 13 MMOL/L (ref 10–20)
AST SERPL W P-5'-P-CCNC: 15 U/L (ref 9–39)
BASOPHILS # BLD AUTO: 0.03 X10*3/UL (ref 0–0.1)
BASOPHILS NFR BLD AUTO: 0.4 %
BILIRUB SERPL-MCNC: 0.4 MG/DL (ref 0–1.2)
BUN SERPL-MCNC: 19 MG/DL (ref 6–23)
CALCIUM SERPL-MCNC: 9.3 MG/DL (ref 8.6–10.6)
CEA SERPL-MCNC: 2.3 UG/L
CHLORIDE SERPL-SCNC: 107 MMOL/L (ref 98–107)
CO2 SERPL-SCNC: 28 MMOL/L (ref 21–32)
CREAT SERPL-MCNC: 0.78 MG/DL (ref 0.5–1.05)
EGFRCR SERPLBLD CKD-EPI 2021: 85 ML/MIN/1.73M*2
EOSINOPHIL # BLD AUTO: 0.06 X10*3/UL (ref 0–0.7)
EOSINOPHIL NFR BLD AUTO: 0.9 %
ERYTHROCYTE [DISTWIDTH] IN BLOOD BY AUTOMATED COUNT: 12.5 % (ref 11.5–14.5)
GLUCOSE SERPL-MCNC: 94 MG/DL (ref 74–99)
HCT VFR BLD AUTO: 42.8 % (ref 36–46)
HGB BLD-MCNC: 14 G/DL (ref 12–16)
IMM GRANULOCYTES # BLD AUTO: 0.01 X10*3/UL (ref 0–0.7)
IMM GRANULOCYTES NFR BLD AUTO: 0.1 % (ref 0–0.9)
LYMPHOCYTES # BLD AUTO: 2.1 X10*3/UL (ref 1.2–4.8)
LYMPHOCYTES NFR BLD AUTO: 31.3 %
MCH RBC QN AUTO: 30.4 PG (ref 26–34)
MCHC RBC AUTO-ENTMCNC: 32.7 G/DL (ref 32–36)
MCV RBC AUTO: 93 FL (ref 80–100)
MONOCYTES # BLD AUTO: 0.5 X10*3/UL (ref 0.1–1)
MONOCYTES NFR BLD AUTO: 7.5 %
NEUTROPHILS # BLD AUTO: 4 X10*3/UL (ref 1.2–7.7)
NEUTROPHILS NFR BLD AUTO: 59.8 %
NRBC BLD-RTO: 0 /100 WBCS (ref 0–0)
PLATELET # BLD AUTO: 248 X10*3/UL (ref 150–450)
POTASSIUM SERPL-SCNC: 4.5 MMOL/L (ref 3.5–5.3)
PROT SERPL-MCNC: 6.7 G/DL (ref 6.4–8.2)
RBC # BLD AUTO: 4.6 X10*6/UL (ref 4–5.2)
SODIUM SERPL-SCNC: 143 MMOL/L (ref 136–145)
WBC # BLD AUTO: 6.7 X10*3/UL (ref 4.4–11.3)

## 2024-12-20 PROCEDURE — 82378 CARCINOEMBRYONIC ANTIGEN: CPT

## 2024-12-20 PROCEDURE — 85025 COMPLETE CBC W/AUTO DIFF WBC: CPT

## 2024-12-20 PROCEDURE — 80053 COMPREHEN METABOLIC PANEL: CPT

## 2024-12-20 PROCEDURE — 36415 COLL VENOUS BLD VENIPUNCTURE: CPT

## 2024-12-26 NOTE — PROGRESS NOTES
Iwona Reardon female   1960 64 y.o.   [id number]      Chief Complaint    Follow-up          HPI  Iwona Reardon is a 64 y.o.     woman diagnosed 2006 (age 46, premenopausal at the time)with right invasive ductal carcinoma, grade 3, 8mm, ER/TX negative, Clu4vgx negative (triple negative). 2007 she had right lumpectomy with -0/2 negative sentinel lymph nodes, -0/7 axillary lymph nodes. 2006 she completed partial breast radiation on NSABP B-39. 2006 she completed chemotherapy Adriamycin and Cytoxan. She has significant right breast fibrosis and scarring and wants to continue care with [facility] due to complicated exam. She wears a breast prosthesis to correct breast asymmetry. She has never had lymphedema but wears a prophylactic compression sleeve for air travel.   Stage T1b N0 M0     She declines Genetic testing & breast MRIs. She will consider genetic testing after her daughter is done having children. Her daughter has a 1 year old boy via IVF.      She has personal history of basal carcinoma excised. in  she was diagnosed with rectosigmoid cancer [id number] treated surgically and with chemotherapy. She had complications including sepsis and had port removed. Her left chest wall has remaining prominent vessels.          BREAST IMAGIN2023 bilateral screening mammogram, BI-RADS Category 2.    REPRODUCTIVE HISTORY: menarche age 13, G2, P2, first birth age 24, chemotherapy induced menopause age 46, no HRT, scattered fibroglandular breast tissue     FAMILY CANCER HISTORY:   Sister: thyroid cancer, NO breast cancer but had LCIS  Maternal Aunt: breast cancer  Paternal Aunt: breast cancer  Paternal 1st cousin: breast cancer age 41    REVIEW OF SYSTEMS    Constitutional:  Negative for appetite change, fatigue, fever and unexpected weight change.   HENT:  Negative for ear pain, hearing loss, nosebleeds, sore throat and trouble swallowing.    Eyes:  Negative for discharge, itching and  visual disturbance.   Respiratory:  Negative for cough, chest tightness and shortness of breath.    Cardiovascular:  Negative for chest pain, palpitations and leg swelling.   Breast: as indicated in HPI  Gastrointestinal:  Negative for abdominal pain, constipation, diarrhea and nausea.   Endocrine: Negative for cold intolerance and heat intolerance.   Genitourinary:  Negative for dysuria, frequency, hematuria, pelvic pain and vaginal bleeding.   Musculoskeletal:  Negative for arthralgias, back pain, gait problem, joint swelling and myalgias.   Skin:  Negative for color change and rash.   Allergic/Immunologic: Negative for environmental allergies and food allergies.   Neurological:  Negative for dizziness, tremors, speech difficulty, weakness, numbness and headaches.   Hematological:  Does not bruise/bleed easily.   Psychiatric/Behavioral:  Negative for agitation, dysphoric mood and sleep disturbance. The patient is not nervous/anxious.         MEDICATIONS  Current Outpatient Medications   Medication Instructions    cholecalciferol (Vitamin D-3) 10 MCG (400 UNIT) tablet 1 tablet, Daily    magnesium glycinate 100 mg tablet Take by mouth.    [id number]-[id number]-iasy-[id number] [phone] mg tablet Take by mouth.        ALLERGIES  Allergies   Allergen Reactions    Latex Unknown    Penicillins Hives and Unknown    Vancomycin Unknown     Occurred during infusion on 2/6/23 and rash persisted afterwards in both arms.        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    Class 1 obesity due to excess calories without serious comorbidity with body mass index (BMI) of 31.0 to 31.9 in adult 06/19/2023    Constipation 01/23/2023    Elevated CEA 01/23/2023    Fibrosclerosis of right breast 01/23/2023    Postoperative breast asymmetry 01/23/2023    Personal history of breast cancer 01/23/2023    Thyroid nodule  01/23/2023    Mixed hyperlipidemia 04/28/2022     Past Medical History:   Diagnosis Date    Benign endometrial hyperplasia 10/06/2014    Complex endometrial hyperplasia    BRBPR (bright red blood per rectum) 01/23/2023    Breast cancer (Multi)     Chronic deep vein thrombosis (DVT) of axillary vein of left upper extremity (Multi) 01/23/2023    eliquis stopped 1/2023    Colon cancer (Multi)     Disease of thyroid gland 07/2022    Surgeon found nodule in pet scan    GERD (gastroesophageal reflux disease) 01/23/2023    Hyperlipidemia     Infection following a procedure, other surgical site, initial encounter 12/04/2014    Wound infection after surgery    MSSA bacteremia 11/22/2022    Other specified diseases of intestine 07/29/2022    Mass of colon    Paraspinal abscess (Multi) 11/22/2022    Personal history of irradiation     Personal history of other benign neoplasm 11/21/2014    History of uterine leiomyoma    Personal history of other diseases of the digestive system 11/18/2013    History of esophageal reflux    Personal history of other infectious and parasitic diseases 05/02/2017    History of herpes zoster    Personal history of other infectious and parasitic diseases 05/02/2017    History of herpes zoster    Postmenopausal bleeding 08/29/2014    Postmenopausal bleeding    Rash and other nonspecific skin eruption 06/29/2017    Skin rash      Past Surgical History:   Procedure Laterality Date    BREAST BIOPSY      BREAST LUMPECTOMY      BREAST SURGERY      COLON SURGERY      COLONOSCOPY  06/2023    DILATION AND CURETTAGE OF UTERUS  08/29/2014    Dilation And Curettage    LYMPH NODE BIOPSY      OTHER SURGICAL HISTORY  11/18/2013    Axillary Lymphadenectomy - Complete    OTHER SURGICAL HISTORY  04/14/2014    Hysteroscopy    OTHER SURGICAL HISTORY  07/29/2022    Laparoscopic sigmoidectomy    OTHER SURGICAL HISTORY  08/16/2022    Low anterior resection    OTHER SURGICAL HISTORY  08/16/2022    Sigmoidoscopy flexible     SENTINEL LYMPH NODE BIOPSY  2013    [place] Lymph Node Biopsy    TOTAL ABDOMINAL HYSTERECTOMY W/ BILATERAL SALPINGOOPHORECTOMY  2014    Total Abdominal Hysterectomy With Removal Of Both Ovaries      Family History   Problem Relation Name Age of Onset    Other (skin conditions) Mother      Cancer Father Everardo Truong     Thyroid cancer Sister Анна Hammonds     Cancer Sister Анна Hammonds     Pancreatic cancer Maternal Grandfather      Breast cancer Mother's Sister  65    Lung cancer Mother's Sister      Other (metastasis to the brain) Mother's Sister      Uterine cancer Mother's Sister      Breast cancer Father's Sister  40    Cancer Brother Akil Truong           SOCIAL HISTORY      Social History     Tobacco Use    Smoking status: Former     Current packs/day: 0.00     Average packs/day: 1 pack/day for 35.0 years (35.0 ttl pk-yrs)     Types: Cigarettes     Start date: 1971     Quit date: 2006     Years since quittin.0    Smokeless tobacco: Never   Substance Use Topics    Alcohol use: Yes     Alcohol/week: 1.0 standard drink of alcohol     Types: 1 Standard drinks or equivalent per week     Comment: Social-Holidays, special gatherings        VITALS  Vitals:    24 1136   BP: 131/83   Pulse: 61   Temp: 36.6 °C (97.9 °F)        PHYSICAL EXAM  Patient is alert and oriented x3, with appropriate mood. Her gait is steady and hand grasps are equal. Sclera clear. The breasts are significantly asymmetrical, left larger. The right breast with large chest wall fibrosis and scarring & divot with healed incision in the superior slight lateral and healed axillary incision. The left breast tissue is soft without palpable abnormalities, discrete nodules or masses. The right breast and chest with significant fibrosis. The nipples appear normal. There is no cervical, supraclavicular, or axillary lymphadenopathy palpable. Heart rate and rhythm normal, S1 and S2 appreciated. The lungs  are clear bilaterally. Abdomen is soft & non-tender.     Physical Exam  Chest:              IMAGING  Screening mammogram, results are pending.     Time was spent viewing digital images of the radiology testing with the patient.        ORDERS  Orders Placed This Encounter   Procedures    BI mammo bilateral screening tomosynthesis     Standing Status:   Future     Standing Expiration Date:   2/26/2026     Order Specific Question:   Perform a breast ultrasound if clinically indicated by Radiologist?     Answer:   Yes     Order Specific Question:   Previous Mamm performed at  location?     Answer:   Yes     Order Specific Question:   Reason for exam:     Answer:   screenng     Order Specific Question:   Radiologist to Determine Optimal Study     Answer:   Yes     Order Specific Question:   Release result to [facility] [facility]     Answer:   Immediate     Order Specific Question:   Is this exam part of a Research Study? If Yes, link this order to the research study     Answer:   No          ASSESSMENT/PLAN  1. Encounter for screening mammogram for malignant neoplasm of breast  BI mammo bilateral screening tomosynthesis      2. Encounter for follow-up surveillance of breast cancer  Clinic Appointment Request      3. Breast fibrosclerosis, right  Clinic Appointment Request             Follow up in about 1 year (around 12/27/2025) for with or after recommended imaging.      Hue Abbasi, APRN-[facility] [place]

## 2024-12-27 ENCOUNTER — OFFICE VISIT (OUTPATIENT)
Dept: SURGICAL ONCOLOGY | Facility: CLINIC | Age: 64
End: 2024-12-27
Payer: COMMERCIAL

## 2024-12-27 ENCOUNTER — APPOINTMENT (OUTPATIENT)
Dept: RADIOLOGY | Facility: CLINIC | Age: 64
End: 2024-12-27
Payer: COMMERCIAL

## 2024-12-27 ENCOUNTER — HOSPITAL ENCOUNTER (OUTPATIENT)
Dept: RADIOLOGY | Facility: CLINIC | Age: 64
Discharge: HOME | End: 2024-12-27
Payer: COMMERCIAL

## 2024-12-27 ENCOUNTER — TELEMEDICINE (OUTPATIENT)
Dept: HEMATOLOGY/ONCOLOGY | Facility: CLINIC | Age: 64
End: 2024-12-27
Payer: COMMERCIAL

## 2024-12-27 VITALS
DIASTOLIC BLOOD PRESSURE: 83 MMHG | TEMPERATURE: 97.9 F | WEIGHT: 172 LBS | BODY MASS INDEX: 29.51 KG/M2 | HEART RATE: 61 BPM | SYSTOLIC BLOOD PRESSURE: 131 MMHG

## 2024-12-27 VITALS — WEIGHT: 186.07 LBS | HEIGHT: 64 IN | BODY MASS INDEX: 31.77 KG/M2

## 2024-12-27 DIAGNOSIS — Z85.3 ENCOUNTER FOR FOLLOW-UP SURVEILLANCE OF BREAST CANCER: ICD-10-CM

## 2024-12-27 DIAGNOSIS — Z12.31 ENCOUNTER FOR SCREENING MAMMOGRAM FOR MALIGNANT NEOPLASM OF BREAST: Primary | ICD-10-CM

## 2024-12-27 DIAGNOSIS — C20 RECTAL CANCER (MULTI): Primary | ICD-10-CM

## 2024-12-27 DIAGNOSIS — Z08 ENCOUNTER FOR FOLLOW-UP SURVEILLANCE OF BREAST CANCER: ICD-10-CM

## 2024-12-27 DIAGNOSIS — C20 RECTAL CANCER (MULTI): ICD-10-CM

## 2024-12-27 DIAGNOSIS — Z12.31 ENCOUNTER FOR SCREENING MAMMOGRAM FOR MALIGNANT NEOPLASM OF BREAST: ICD-10-CM

## 2024-12-27 DIAGNOSIS — N60.31: ICD-10-CM

## 2024-12-27 PROCEDURE — 99214 OFFICE O/P EST MOD 30 MIN: CPT | Performed by: NURSE PRACTITIONER

## 2024-12-27 PROCEDURE — 77067 SCR MAMMO BI INCL CAD: CPT

## 2024-12-27 PROCEDURE — 99213 OFFICE O/P EST LOW 20 MIN: CPT | Performed by: NURSE PRACTITIONER

## 2024-12-27 ASSESSMENT — PAIN SCALES - GENERAL: PAINLEVEL_OUTOF10: 0-NO PAIN

## 2024-12-27 NOTE — PROGRESS NOTES
Patient ID: Iwona Reardon is a 64 y.o. female from [place], [place].     Referring Physician: Javi Mulligan MD  [id number] [place] [address]    Primary Care Provider: Gianni Pro MD    Cancer History:          Breast         AJCC Edition: 7th (AJCC), Diagnosis Date: 21-Jun-2006, IA- Right, T1b pN0 M0          Colon and Rectum         AJCC Edition: 8th (AJCC), Diagnosis Date: Jul 2022, IIIB, pT3 pN1b cM0      Treatment Synopsis:    1. Sigmoid cancer  January 2022 started having constipation and blood in stool  She was referred to GI which  was quite delayed  Colonoscopy finally done in June  She now has a confirmed upper rectosigmoid CA - treated like  a colon cancer  LAR 7/6/22  [id number]  2/20 + nodes. Small vessel invasion  NICHOLAS     2. Right-sided breast cancer.   CHARACTERISTICS: Presenting in July 2006 with a right-sided 0.8 cm grade 3 infiltrating ductal carcinoma with triple-negative characteristics. She has seven negative lymph nodes and two negative sentinel lymph nodes.     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 Raymond also had breast cancer. She has some paternal and maternal cousins with breast cancer as well and a sister with a thyroid  malignancy.      PRIOR TREATMENT:   1. Right lumpectomy and axillary node evaluation in July 2006.   2. Right-sided accelerated partial breast radiation in August 2006 per NSABP B-39.   3. She had four cycles of Adriamycin and Cytoxan, completed in November 2006.            Treatment History:    2022-8-9: Chemotherapy: Cycle 1 Folfox/ adjuvant  2022-11-4: Chemotherapy: adjuvant therapy aborted with sepsis and central line complications          Past Medical History: Iwona has a past medical history of Benign endometrial hyperplasia (10/06/2014), BRBPR (bright red blood per rectum) (01/23/2023), Breast cancer (Multi),  Chronic deep vein thrombosis (DVT) of axillary vein of left upper extremity (Multi) (01/23/2023), Colon cancer (Multi), Disease of thyroid gland (07/2022), GERD (gastroesophageal reflux disease) (01/23/2023), Hyperlipidemia, Infection following a procedure, other surgical site, initial encounter (12/04/2014), MSSA bacteremia (11/22/2022), Other specified diseases of intestine (07/29/2022), 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  Surgical History:  Iwona has a past surgical history that includes Other surgical history (11/18/2013); [place] lymph node biopsy (11/18/2013); Dilation and curettage of uterus (08/29/2014); Total abdominal hysterectomy w/ bilateral salpingoophorectomy (12/04/2014); Other surgical history (04/14/2014); Other surgical history (07/29/2022); Other surgical history (08/16/2022); Other surgical history (08/16/2022); Breast surgery; Colon surgery; Lymph node biopsy; Colonoscopy (06/2023); Breast biopsy; and Breast lumpectomy.  Social History:  Iwona reports that she quit smoking about 19 years ago. Her smoking use included cigarettes. She started smoking about 54 years ago. She has a 35 pack-year smoking history. She has never used smokeless tobacco. She reports current alcohol use of about 1.0 standard drink of alcohol per week. She reports that she does not use drugs.  Family History:    Family History   Problem Relation Name Age of Onset    Other (skin conditions) Mother      Cancer Father Everardo Truong     Thyroid cancer Sister Анна Hammonds     Cancer Sister Анна Hammonds      Pancreatic cancer Maternal Grandfather      Breast cancer Mother's Sister  65    Lung cancer Mother's Sister      Other (metastasis to the brain) Mother's Sister      Uterine cancer Mother's Sister      Breast cancer Father's Sister  40    Cancer Brother Akil Truong      Family Oncology History:  Cancer-related family history includes Breast cancer (age of onset: 40) in her father's sister; Breast cancer (age of onset: 65) in her mother's sister; Cancer in her brother, father, and sister; Lung cancer in her mother's sister; Pancreatic cancer in her maternal grandfather; Thyroid cancer in her sister; Uterine cancer in her mother's sister.    Virtual or Telephone Consent    An interactive audio and video telecommunication system which permits real time communications between the patient (at the originating site) and provider (at the distant site) was utilized to provide this telehealth service.   Verbal consent was requested and obtained from Iwona Reardon on this date, 12/27/24 for a telehealth visit.      SUBJECTIVE:    History of Present Illness:  Iwona Reardon is a 64 y.o. female who was referred by Javi Mulligan MD and presents with surveillance visit.     Seen In 4 month follow up visit for colon cancer.   Now a little over 2 yrs out from diagnosis.  Overall feeling well.     No abdominal pain   NO nausea   Appetite is good - wt is stable.     All other ROS reviewed & are negative         OBJECTIVE:    VS / Pain:  There were no vitals taken for this visit.  BSA: There is no height or weight on file to calculate BSA.   Pain Scale: 0    Daily Weight  06/20/24 : 84.4 kg (186 lb)  06/11/24 : 82 kg (180 lb 12.8 oz)  02/13/24 : 82.8 kg (182 lb 8.7 oz)      Physical Exam  Constitutional:       General: She is not in acute distress.        Performance Status:       Diagnostic Results         WBC   Date/Time Value Ref Range Status   12/20/2024 08:08 AM 6.7 4.4 - 11.3 x10*3/uL Final   08/20/2024 01:09  PM 8.8 4.4 - 11.3 x10*3/uL Final   06/01/2024 08:25 AM 7.0 4.4 - 11.3 x10*3/uL Final     Hemoglobin   Date Value Ref Range Status   12/20/2024 14.0 12.0 - 16.0 g/dL Final   08/20/2024 14.4 12.0 - 16.0 g/dL Final   06/01/2024 14.1 12.0 - 16.0 g/dL Final     MCV   Date/Time Value Ref Range Status   12/20/2024 08:08 AM 93 80 - 100 fL Final   08/20/2024 01:09 PM 93 80 - 100 fL Final   06/01/2024 08:25 AM 92 80 - 100 fL Final     Platelets   Date/Time Value Ref Range Status   12/20/2024 08:08  150 - 450 x10*3/uL Final   08/20/2024 01:09  150 - 450 x10*3/uL Final   06/01/2024 08:25  150 - 450 x10*3/uL Final     Neutrophils Absolute   Date/Time Value Ref Range Status   12/20/2024 08:08 AM 4.00 1.20 - 7.70 x10*3/uL Final     Comment:     Percent differential counts (%) should be interpreted in the context of the absolute cell counts (cells/uL).   08/20/2024 01:09 PM 5.15 1.20 - 7.70 x10*3/uL Final     Comment:     Percent differential counts (%) should be interpreted in the context of the absolute cell counts (cells/uL).   06/01/2024 08:25 AM 4.76 1.20 - 7.70 x10*3/uL Final     Comment:     Percent differential counts (%) should be interpreted in the context of the absolute cell counts (cells/uL).     Bilirubin, Total   Date/Time Value Ref Range Status   12/20/2024 08:08 AM 0.4 0.0 - 1.2 mg/dL Final   08/20/2024 01:09 PM 0.3 0.0 - 1.2 mg/dL Final   06/01/2024 08:25 AM 0.3 0.0 - 1.2 mg/dL Final     AST   Date/Time Value Ref Range Status   12/20/2024 08:08 AM 15 9 - 39 U/L Final   08/20/2024 01:09 PM 17 9 - 39 U/L Final   06/01/2024 08:25 AM 15 9 - 39 U/L Final     ALT   Date/Time Value Ref Range Status   12/20/2024 08:08 AM 15 7 - 45 U/L Final     Comment:     Patients treated with Sulfasalazine may generate falsely decreased results for ALT.   08/20/2024 01:09 PM 15 7 - 45 U/L Final     Comment:     Patients treated with Sulfasalazine may generate falsely decreased results for ALT.   06/01/2024 08:25 AM 16  "7 - 45 U/L Final     Comment:     Patients treated with Sulfasalazine may generate falsely decreased results for ALT.     Creatinine   Date/Time Value Ref Range Status   12/20/2024 08:08 AM 0.78 0.50 - 1.05 mg/dL Final   08/20/2024 01:09 PM 1.03 0.50 - 1.05 mg/dL Final   06/01/2024 08:25 AM 0.73 0.50 - 1.05 mg/dL Final     Urea Nitrogen   Date/Time Value Ref Range Status   12/20/2024 08:08 AM 19 6 - 23 mg/dL Final   08/20/2024 01:09 PM 23 6 - 23 mg/dL Final   06/01/2024 08:25 AM 25 (H) 6 - 23 mg/dL Final     Albumin   Date/Time Value Ref Range Status   12/20/2024 08:08 AM 4.2 3.4 - 5.0 g/dL Final   08/20/2024 01:09 PM 4.2 3.4 - 5.0 g/dL Final   06/01/2024 08:25 AM 4.2 3.4 - 5.0 g/dL Final     No results found for: \"\"  Carcinoembryonic AG   Date/Time Value Ref Range Status   12/20/2024 08:08 AM 2.3 ug/L Final   06/01/2024 08:25 AM 1.6 ug/L Final   02/09/2024 07:24 AM 2.0 ug/L Final       CT CHEST ABDOMEN PELVIS W IV CONTRAST    - Impression -  CHEST:  1. Stable bilateral subcentimeter benign-looking pulmonary nodules. No new suspicious pulmonary nodules or masses.  2. Stable right upper lobe subpleural postradiation changes.  3. No new enlarged intrathoracic lymphadenopathy.  4. Stable appearance of the right breast.    ABDOMEN-PELVIS:  1. Status post prior low anterior resection with no locoregional recurrence or metastatic disease in the abdomen or pelvis.    Signed by: Thony Almaraz 8/22/2024 11:34 AM  Dictation workstation:   [id number]    SIGNATERA _ negative x 8 - last Aug 2024     Assessment/Plan     Assessment:    1. Sigmoid Cancer  Stage IIIB s/p resection 7/2022. NICHOLAS  High risk features with 2/20 nodes and small vessel invasion.  Discussed adjuvant therapy with Oxaliplatin and 5 FU.  She received ~2 cycles and had neutorpenic sepsis in ICU so chemo discontinued thereafter.    Continue ongoing surveillance for her colorectal cancer.           Now >2 yrs out from colon cancer surgery - we reviewed that " CEA level is still normal but is up slightly at 2.3.  Will repeat CEA & signatera in 6 - 8 weeks with follow up visit to review results.     -Colonoscopy negative 7/2023.  Next  Colonoscopy 7/2026   - Next CT due Aug 2025 unless cea trends up      2. Breast Cancer    - now 18 yrs out from diagnosis   s/p 4 cycles of AC - was seeing Hue Abbasi, now following with PCP.            3. Genetics  referral placed - she is holding off on consultation until after daughters fertility tx      4. Thyroid nodule: Nov 2023 undeerwent left thyroid lobectomy with Dr. Guajardo.  1.6 cm classic variant of papillary thyroid cancer. There was 1 lymph node removed with the specimen that was negative for cancer. There was no extrathyroidal extension and no vascular invasion.      5. Vascular: hx of L sided DVT and sepsis: some residual swelling of left arm. Chest   - Check us LUE and chest.   - Negative June 2024    Swelling due to collaterals.      RAMYA [facility]/ [facility]  Office: [phone]  Fax: [phone]

## 2025-01-27 ENCOUNTER — LAB (OUTPATIENT)
Dept: LAB | Facility: HOSPITAL | Age: 65
End: 2025-01-27
Payer: COMMERCIAL

## 2025-01-27 DIAGNOSIS — C20 RECTAL CANCER (MULTI): ICD-10-CM

## 2025-01-27 LAB — CEA SERPL-MCNC: 2.3 UG/L

## 2025-01-27 PROCEDURE — 82378 CARCINOEMBRYONIC ANTIGEN: CPT

## 2025-01-27 PROCEDURE — 36415 COLL VENOUS BLD VENIPUNCTURE: CPT

## 2025-04-11 ENCOUNTER — APPOINTMENT (OUTPATIENT)
Dept: HEMATOLOGY/ONCOLOGY | Facility: CLINIC | Age: 65
End: 2025-04-11
Payer: COMMERCIAL

## 2025-04-21 ENCOUNTER — LAB (OUTPATIENT)
Dept: LAB | Facility: HOSPITAL | Age: 65
End: 2025-04-21
Payer: COMMERCIAL

## 2025-04-21 DIAGNOSIS — C20 MALIGNANT NEOPLASM OF RECTUM (MULTI): Primary | ICD-10-CM

## 2025-04-21 PROCEDURE — 82378 CARCINOEMBRYONIC ANTIGEN: CPT

## 2025-04-29 ENCOUNTER — TELEMEDICINE (OUTPATIENT)
Dept: HEMATOLOGY/ONCOLOGY | Facility: CLINIC | Age: 65
End: 2025-04-29
Payer: COMMERCIAL

## 2025-04-29 DIAGNOSIS — C20 RECTAL CANCER (MULTI): Primary | ICD-10-CM

## 2025-04-29 DIAGNOSIS — C20 RECTAL CANCER (MULTI): ICD-10-CM

## 2025-04-29 PROCEDURE — 99213 OFFICE O/P EST LOW 20 MIN: CPT | Performed by: NURSE PRACTITIONER

## 2025-04-29 NOTE — PROGRESS NOTES
Patient ID: Iwona Reardon is a 64 y.o. female from [place], [place].     Referring Physician: Betsy Chambers, APRN-CNP  [address] [place] [address]    Primary Care Provider: Gianni Pro MD    Cancer History:          Breast         AJCC Edition: 7th (AJCC), Diagnosis Date: 21-Jun-2006, IA- Right, T1b pN0 M0          Colon and Rectum         AJCC Edition: 8th (AJCC), Diagnosis Date: Jul 2022, IIIB, pT3 pN1b cM0      Treatment Synopsis:    1. Sigmoid cancer  January 2022 started having constipation and blood in stool  She was referred to GI which  was quite delayed  Colonoscopy finally done in June  She now has a confirmed upper rectosigmoid CA - treated like  a colon cancer  LAR 7/6/22  [id number]  2/20 + nodes. Small vessel invasion  NICHOLAS     2. Right-sided breast cancer.   CHARACTERISTICS: Presenting in July 2006 with a right-sided 0.8 cm grade 3 infiltrating ductal carcinoma with triple-negative characteristics. She has seven negative lymph nodes and two negative sentinel lymph nodes.     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 Raymond also had breast cancer. She has some paternal and maternal cousins with breast cancer as well and a sister with a thyroid  malignancy.      PRIOR TREATMENT:   1. Right lumpectomy and axillary node evaluation in July 2006.   2. Right-sided accelerated partial breast radiation in August 2006 per NSABP B-39.   3. She had four cycles of Adriamycin and Cytoxan, completed in November 2006.            Treatment History:    2022-8-9: Chemotherapy: Cycle 1 Folfox/ adjuvant  2022-11-4: Chemotherapy: adjuvant therapy aborted with sepsis and central line complications          Past Medical History: Iwona has a past medical history of Benign endometrial hyperplasia (10/06/2014), BRBPR (bright red blood per rectum) (01/23/2023), Breast cancer  (Multi), Chronic deep vein thrombosis (DVT) of axillary vein of left upper extremity (Multi) (01/23/2023), Colon cancer (Multi), Disease of thyroid gland (07/2022), GERD (gastroesophageal reflux disease) (01/23/2023), Hyperlipidemia, Infection following a procedure, other surgical site, initial encounter (12/04/2014), MSSA bacteremia (11/22/2022), Other specified diseases of intestine (07/29/2022), 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  Surgical History:  Iwona has a past surgical history that includes Other surgical history (11/18/2013); [place] lymph node biopsy (11/18/2013); Dilation and curettage of uterus (08/29/2014); Total abdominal hysterectomy w/ bilateral salpingoophorectomy (12/04/2014); Other surgical history (04/14/2014); Other surgical history (07/29/2022); Other surgical history (08/16/2022); Other surgical history (08/16/2022); Breast surgery; Colon surgery; Lymph node biopsy; Colonoscopy (06/2023); Breast biopsy; and Breast lumpectomy.  Social History:  Iwona reports that she quit smoking about 19 years ago. Her smoking use included cigarettes. She started smoking about 54 years ago. She has a 35 pack-year smoking history. She has never used smokeless tobacco. She reports current alcohol use of about 1.0 standard drink of alcohol per week. She reports that she does not use drugs.  Family History:    Family History   Problem Relation Name Age of Onset    Other (skin conditions) Mother      Cancer Father Everardo Truong     Thyroid cancer Sister Анна Hammonds     Cancer Sister Анна  Cassius     Pancreatic cancer Maternal Grandfather      Breast cancer Mother's Sister  65    Lung cancer Mother's Sister      Other (metastasis to the brain) Mother's Sister      Uterine cancer Mother's Sister      Breast cancer Father's Sister  40    Cancer Brother Akil Truong      Family Oncology History:  Cancer-related family history includes Breast cancer (age of onset: 40) in her father's sister; Breast cancer (age of onset: 65) in her mother's sister; Cancer in her brother, father, and sister; Lung cancer in her mother's sister; Pancreatic cancer in her maternal grandfather; Thyroid cancer in her sister; Uterine cancer in her mother's sister.    Virtual or Telephone Consent    An interactive audio and video telecommunication system which permits real time communications between the patient (at the originating site) and provider (at the distant site) was utilized to provide this telehealth service.   Verbal consent was requested and obtained from Iwona Reardon on this date, 04/29/25 for a telehealth visit.      SUBJECTIVE:    History of Present Illness:  Iwona Reardon is a 64 y.o. female who was referred by Javi Mulligan and presents with surveillance visit.     Seen In 4 month follow up visit for colon cancer.   Almost 3yrs out from diagnosis.  Overall feeling well.     No abdominal pain   NO nausea   Appetite is good - wt is stable.       All other ROS reviewed & are negative         OBJECTIVE:    VS / Pain:  There were no vitals taken for this visit.  BSA: There is no height or weight on file to calculate BSA.   Pain Scale: 0    Daily Weight  12/27/24 : 84.4 kg (186 lb 1.1 oz)  12/27/24 : 78 kg (172 lb)  06/20/24 : 84.4 kg (186 lb)      Physical Exam  Constitutional:       General: She is not in acute distress.        Performance Status:0       Diagnostic Results         WBC   Date/Time Value Ref Range Status   12/20/2024 08:08 AM 6.7 4.4 - 11.3 x10*3/uL Final   08/20/2024 01:09 PM 8.8 4.4 -  11.3 x10*3/uL Final   06/01/2024 08:25 AM 7.0 4.4 - 11.3 x10*3/uL Final     Hemoglobin   Date Value Ref Range Status   12/20/2024 14.0 12.0 - 16.0 g/dL Final   08/20/2024 14.4 12.0 - 16.0 g/dL Final   06/01/2024 14.1 12.0 - 16.0 g/dL Final     MCV   Date/Time Value Ref Range Status   12/20/2024 08:08 AM 93 80 - 100 fL Final   08/20/2024 01:09 PM 93 80 - 100 fL Final   06/01/2024 08:25 AM 92 80 - 100 fL Final     Platelets   Date/Time Value Ref Range Status   12/20/2024 08:08  150 - 450 x10*3/uL Final   08/20/2024 01:09  150 - 450 x10*3/uL Final   06/01/2024 08:25  150 - 450 x10*3/uL Final     Neutrophils Absolute   Date/Time Value Ref Range Status   12/20/2024 08:08 AM 4.00 1.20 - 7.70 x10*3/uL Final     Comment:     Percent differential counts (%) should be interpreted in the context of the absolute cell counts (cells/uL).   08/20/2024 01:09 PM 5.15 1.20 - 7.70 x10*3/uL Final     Comment:     Percent differential counts (%) should be interpreted in the context of the absolute cell counts (cells/uL).   06/01/2024 08:25 AM 4.76 1.20 - 7.70 x10*3/uL Final     Comment:     Percent differential counts (%) should be interpreted in the context of the absolute cell counts (cells/uL).     Bilirubin, Total   Date/Time Value Ref Range Status   12/20/2024 08:08 AM 0.4 0.0 - 1.2 mg/dL Final   08/20/2024 01:09 PM 0.3 0.0 - 1.2 mg/dL Final   06/01/2024 08:25 AM 0.3 0.0 - 1.2 mg/dL Final     AST   Date/Time Value Ref Range Status   12/20/2024 08:08 AM 15 9 - 39 U/L Final   08/20/2024 01:09 PM 17 9 - 39 U/L Final   06/01/2024 08:25 AM 15 9 - 39 U/L Final     ALT   Date/Time Value Ref Range Status   12/20/2024 08:08 AM 15 7 - 45 U/L Final     Comment:     Patients treated with Sulfasalazine may generate falsely decreased results for ALT.   08/20/2024 01:09 PM 15 7 - 45 U/L Final     Comment:     Patients treated with Sulfasalazine may generate falsely decreased results for ALT.   06/01/2024 08:25 AM 16 7 - 45 U/L  "Final     Comment:     Patients treated with Sulfasalazine may generate falsely decreased results for ALT.     Creatinine   Date/Time Value Ref Range Status   12/20/2024 08:08 AM 0.78 0.50 - 1.05 mg/dL Final   08/20/2024 01:09 PM 1.03 0.50 - 1.05 mg/dL Final   06/01/2024 08:25 AM 0.73 0.50 - 1.05 mg/dL Final     Urea Nitrogen   Date/Time Value Ref Range Status   12/20/2024 08:08 AM 19 6 - 23 mg/dL Final   08/20/2024 01:09 PM 23 6 - 23 mg/dL Final   06/01/2024 08:25 AM 25 (H) 6 - 23 mg/dL Final     Albumin   Date/Time Value Ref Range Status   12/20/2024 08:08 AM 4.2 3.4 - 5.0 g/dL Final   08/20/2024 01:09 PM 4.2 3.4 - 5.0 g/dL Final   06/01/2024 08:25 AM 4.2 3.4 - 5.0 g/dL Final     No results found for: \"\"  Carcinoembryonic AG   Date/Time Value Ref Range Status   04/21/2025 10:10 AM 1.6 ug/L Final   01/27/2025 03:32 PM 2.3 ug/L Final   12/20/2024 08:08 AM 2.3 ug/L Final     Aug 2024 -   CT CHEST ABDOMEN PELVIS W IV CONTRAST    - Impression -  CHEST:  1. Stable bilateral subcentimeter benign-looking pulmonary nodules. No new suspicious pulmonary nodules or masses.  2. Stable right upper lobe subpleural postradiation changes.  3. No new enlarged intrathoracic lymphadenopathy.  4. Stable appearance of the right breast.    ABDOMEN-PELVIS:  1. Status post prior low anterior resection with no locoregional recurrence or metastatic disease in the abdomen or pelvis.    Signed by: Thony Almaraz 8/22/2024 11:34 AM  Dictation workstation:   [id number]    SIGNATERA _ negative x 8 - last Aug 2024     Assessment/Plan     Assessment:    1. Sigmoid Cancer  Stage IIIB s/p resection 7/2022. NICHOLAS  High risk features with 2/20 nodes and small vessel invasion.  Discussed adjuvant therapy with Oxaliplatin and 5 FU.  She received ~2 cycles and had neutorpenic sepsis in ICU so chemo discontinued thereafter.    Continue ongoing surveillance for her colorectal cancer.          CEA level is normal at 1.6 (had bumped up slightly to 2.3 in " Dec)    -Colonoscopy negative 7/2023.  Next  Colonoscopy 7/2026   - Next CT due Aug 2025      2. Breast Cancer    - now 18 yrs out from diagnosis   s/p 4 cycles of AC - was seeing Hue Abbasi, now following with PCP.            3. Genetics  referral placed - she is holding off on consultation until after daughters fertility tx      4. Thyroid nodule: Nov 2023 undeerwent left thyroid lobectomy with Dr. Guajardo.  1.6 cm classic variant of papillary thyroid cancer. There was 1 lymph node removed with the specimen that was negative for cancer. There was no extrathyroidal extension and no vascular invasion.      5. Vascular: hx of L sided DVT and sepsis: some residual swelling of left arm. Chest   - Check us LUE and chest.   - Negative June 2024    Swelling due to collaterals.     RTC in 4 mos with labs & annual  scans      RAMYA [facility]/ [facility]  Office: [phone]  Fax: [phone]

## 2025-06-23 ENCOUNTER — TELEPHONE (OUTPATIENT)
Dept: PRIMARY CARE | Facility: CLINIC | Age: 65
End: 2025-06-23
Payer: COMMERCIAL

## 2025-06-23 DIAGNOSIS — C20 RECTAL CANCER (MULTI): ICD-10-CM

## 2025-06-23 DIAGNOSIS — E78.2 MIXED HYPERLIPIDEMIA: Primary | ICD-10-CM

## 2025-06-23 DIAGNOSIS — Z13.89 SCREENING FOR BLOOD OR PROTEIN IN URINE: ICD-10-CM

## 2025-06-23 DIAGNOSIS — E89.0 H/O PARTIAL THYROIDECTOMY: ICD-10-CM

## 2025-06-25 ASSESSMENT — PROMIS GLOBAL HEALTH SCALE
CARRYOUT_PHYSICAL_ACTIVITIES: COMPLETELY
RATE_QUALITY_OF_LIFE: VERY GOOD
RATE_SOCIAL_SATISFACTION: EXCELLENT
RATE_PHYSICAL_HEALTH: VERY GOOD
CARRYOUT_SOCIAL_ACTIVITIES: EXCELLENT
RATE_AVERAGE_FATIGUE: MILD
RATE_GENERAL_HEALTH: VERY GOOD
EMOTIONAL_PROBLEMS: NEVER
RATE_MENTAL_HEALTH: EXCELLENT
RATE_AVERAGE_PAIN: 0

## 2025-06-26 ENCOUNTER — APPOINTMENT (OUTPATIENT)
Dept: PRIMARY CARE | Facility: CLINIC | Age: 65
End: 2025-06-26
Payer: COMMERCIAL

## 2025-06-26 VITALS
RESPIRATION RATE: 12 BRPM | HEIGHT: 64 IN | SYSTOLIC BLOOD PRESSURE: 128 MMHG | WEIGHT: 187 LBS | DIASTOLIC BLOOD PRESSURE: 84 MMHG | HEART RATE: 84 BPM | BODY MASS INDEX: 31.92 KG/M2 | OXYGEN SATURATION: 98 %

## 2025-06-26 DIAGNOSIS — Z13.820 ENCOUNTER FOR OSTEOPOROSIS SCREENING IN ASYMPTOMATIC POSTMENOPAUSAL PATIENT: ICD-10-CM

## 2025-06-26 DIAGNOSIS — E66.09 CLASS 1 OBESITY DUE TO EXCESS CALORIES WITHOUT SERIOUS COMORBIDITY WITH BODY MASS INDEX (BMI) OF 32.0 TO 32.9 IN ADULT: ICD-10-CM

## 2025-06-26 DIAGNOSIS — R73.01 IFG (IMPAIRED FASTING GLUCOSE): ICD-10-CM

## 2025-06-26 DIAGNOSIS — E04.1 THYROID NODULE: ICD-10-CM

## 2025-06-26 DIAGNOSIS — E89.0 H/O PARTIAL THYROIDECTOMY: ICD-10-CM

## 2025-06-26 DIAGNOSIS — Z78.0 ENCOUNTER FOR OSTEOPOROSIS SCREENING IN ASYMPTOMATIC POSTMENOPAUSAL PATIENT: ICD-10-CM

## 2025-06-26 DIAGNOSIS — C20 RECTAL CANCER (MULTI): ICD-10-CM

## 2025-06-26 DIAGNOSIS — E78.2 MIXED HYPERLIPIDEMIA: ICD-10-CM

## 2025-06-26 DIAGNOSIS — Z00.00 HEALTHCARE MAINTENANCE: Primary | ICD-10-CM

## 2025-06-26 DIAGNOSIS — E66.811 CLASS 1 OBESITY DUE TO EXCESS CALORIES WITHOUT SERIOUS COMORBIDITY WITH BODY MASS INDEX (BMI) OF 32.0 TO 32.9 IN ADULT: ICD-10-CM

## 2025-06-26 LAB
ALBUMIN SERPL-MCNC: 4.2 G/DL (ref 3.6–5.1)
ALP SERPL-CCNC: 69 U/L (ref 37–153)
ALT SERPL-CCNC: 14 U/L (ref 6–29)
ANION GAP SERPL CALCULATED.4IONS-SCNC: 9 MMOL/L (CALC) (ref 7–17)
APPEARANCE UR: ABNORMAL
AST SERPL-CCNC: 14 U/L (ref 10–35)
BACTERIA #/AREA URNS HPF: ABNORMAL /HPF
BACTERIA UR CULT: ABNORMAL
BILIRUB SERPL-MCNC: 0.4 MG/DL (ref 0.2–1.2)
BILIRUB UR QL STRIP: NEGATIVE
BUN SERPL-MCNC: 23 MG/DL (ref 7–25)
CALCIUM SERPL-MCNC: 9.3 MG/DL (ref 8.6–10.4)
CHLORIDE SERPL-SCNC: 108 MMOL/L (ref 98–110)
CHOLEST SERPL-MCNC: 222 MG/DL
CHOLEST/HDLC SERPL: 4.8 (CALC)
CO2 SERPL-SCNC: 25 MMOL/L (ref 20–32)
COLOR UR: YELLOW
CREAT SERPL-MCNC: 0.78 MG/DL (ref 0.5–1.05)
EGFRCR SERPLBLD CKD-EPI 2021: 85 ML/MIN/1.73M2
ERYTHROCYTE [DISTWIDTH] IN BLOOD BY AUTOMATED COUNT: 13.8 % (ref 11–15)
GLUCOSE SERPL-MCNC: 107 MG/DL (ref 65–99)
GLUCOSE UR QL STRIP: NEGATIVE
HCT VFR BLD AUTO: 45.1 % (ref 35–45)
HDLC SERPL-MCNC: 46 MG/DL
HGB BLD-MCNC: 14.8 G/DL (ref 11.7–15.5)
HGB UR QL STRIP: NEGATIVE
HYALINE CASTS #/AREA URNS LPF: ABNORMAL /LPF
KETONES UR QL STRIP: ABNORMAL
LDLC SERPL CALC-MCNC: 148 MG/DL (CALC)
LEUKOCYTE ESTERASE UR QL STRIP: NEGATIVE
MCH RBC QN AUTO: 31.1 PG (ref 27–33)
MCHC RBC AUTO-ENTMCNC: 32.8 G/DL (ref 32–36)
MCV RBC AUTO: 94.7 FL (ref 80–100)
NITRITE UR QL STRIP: NEGATIVE
NONHDLC SERPL-MCNC: 176 MG/DL (CALC)
PH UR STRIP: ABNORMAL [PH] (ref 5–8)
PLATELET # BLD AUTO: 275 THOUSAND/UL (ref 140–400)
PMV BLD REES-ECKER: 11.3 FL (ref 7.5–12.5)
POTASSIUM SERPL-SCNC: 4.2 MMOL/L (ref 3.5–5.3)
PROT SERPL-MCNC: 6.8 G/DL (ref 6.1–8.1)
PROT UR QL STRIP: ABNORMAL
RBC # BLD AUTO: 4.76 MILLION/UL (ref 3.8–5.1)
RBC #/AREA URNS HPF: ABNORMAL /HPF
SERVICE CMNT-IMP: ABNORMAL
SODIUM SERPL-SCNC: 142 MMOL/L (ref 135–146)
SP GR UR STRIP: 1.03 (ref 1–1.03)
SQUAMOUS #/AREA URNS HPF: ABNORMAL /HPF
TRIGL SERPL-MCNC: 151 MG/DL
TSH SERPL-ACNC: 4.24 MIU/L (ref 0.4–4.5)
WBC # BLD AUTO: 7.7 THOUSAND/UL (ref 3.8–10.8)
WBC #/AREA URNS HPF: ABNORMAL /HPF

## 2025-06-26 PROCEDURE — 99396 PREV VISIT EST AGE 40-64: CPT | Performed by: FAMILY MEDICINE

## 2025-06-26 PROCEDURE — 3008F BODY MASS INDEX DOCD: CPT | Performed by: FAMILY MEDICINE

## 2025-06-26 PROCEDURE — 1036F TOBACCO NON-USER: CPT | Performed by: FAMILY MEDICINE

## 2025-06-26 RX ORDER — DIPHENHYDRAMINE HCL 25 MG
25 CAPSULE ORAL NIGHTLY PRN
COMMUNITY

## 2025-06-26 ASSESSMENT — ENCOUNTER SYMPTOMS
CHEST TIGHTNESS: 0
MYALGIAS: 0
NAUSEA: 0
DYSURIA: 0
DYSPHORIC MOOD: 0
ADENOPATHY: 0
SLEEP DISTURBANCE: 0
BRUISES/BLEEDS EASILY: 0
WHEEZING: 0
FEVER: 0
NERVOUS/ANXIOUS: 0
DIFFICULTY URINATING: 0
CHILLS: 0
LIGHT-HEADEDNESS: 0
HEADACHES: 0
FATIGUE: 0
SHORTNESS OF BREATH: 0
ABDOMINAL DISTENTION: 0
SINUS PRESSURE: 0
ABDOMINAL PAIN: 0
DIARRHEA: 0
APPETITE CHANGE: 0
ARTHRALGIAS: 0

## 2025-06-26 ASSESSMENT — PATIENT HEALTH QUESTIONNAIRE - PHQ9
1. LITTLE INTEREST OR PLEASURE IN DOING THINGS: NOT AT ALL
3. TROUBLE FALLING OR STAYING ASLEEP OR SLEEPING TOO MUCH: NOT AT ALL
SUM OF ALL RESPONSES TO PHQ QUESTIONS 1-9: 0
7. TROUBLE CONCENTRATING ON THINGS, SUCH AS READING THE NEWSPAPER OR WATCHING TELEVISION: NOT AT ALL
4. FEELING TIRED OR HAVING LITTLE ENERGY: NOT AT ALL
SUM OF ALL RESPONSES TO PHQ9 QUESTIONS 1 AND 2: 0
6. FEELING BAD ABOUT YOURSELF - OR THAT YOU ARE A FAILURE OR HAVE LET YOURSELF OR YOUR FAMILY DOWN: NOT AT ALL
9. THOUGHTS THAT YOU WOULD BE BETTER OFF DEAD, OR OF HURTING YOURSELF: NOT AT ALL
8. MOVING OR SPEAKING SO SLOWLY THAT OTHER PEOPLE COULD HAVE NOTICED. OR THE OPPOSITE, BEING SO FIGETY OR RESTLESS THAT YOU HAVE BEEN MOVING AROUND A LOT MORE THAN USUAL: NOT AT ALL
5. POOR APPETITE OR OVEREATING: NOT AT ALL
2. FEELING DOWN, DEPRESSED OR HOPELESS: NOT AT ALL

## 2025-06-26 NOTE — PROGRESS NOTES
"Subjective   Patient ID: Iwona Reardon is a 64 y.o. female who presents for Annual Exam.    PMHX, PSHx, Fam hx, and Social hx reviewed.   New concerns none  Vaccines Pneumonia and Shingles vaccines recommended  Dentist seen at least yearly yes  Vision concerns none  Hearing concerns none  Diet is overall healthy.   Smoker - no  Lung CT screening - NA, gets CT scans from Dr Mulligan with hx rectal cancer  Alcohol use - averages  0 drinks per week  Exercising 2 days per week.   Colonoscopy 2023, current  Mammogram current  Last PAP NA  DEXA due               Review of Systems   Constitutional:  Negative for appetite change, chills, fatigue and fever.   HENT:  Negative for congestion, ear pain and sinus pressure.    Eyes:  Negative for visual disturbance.   Respiratory:  Negative for chest tightness, shortness of breath and wheezing.    Cardiovascular:  Negative for chest pain.   Gastrointestinal:  Negative for abdominal distention, abdominal pain, diarrhea and nausea.   Genitourinary:  Negative for difficulty urinating, dysuria and pelvic pain.   Musculoskeletal:  Negative for arthralgias and myalgias.   Skin:  Negative for rash.   Allergic/Immunologic: Negative for immunocompromised state.   Neurological:  Negative for light-headedness and headaches.   Hematological:  Negative for adenopathy. Does not bruise/bleed easily.   Psychiatric/Behavioral:  Negative for dysphoric mood and sleep disturbance. The patient is not nervous/anxious.        Objective   /84   Pulse 84   Resp 12   Ht 1.626 m (5' 4\")   Wt 84.8 kg (187 lb)   SpO2 98%   BMI 32.10 kg/m²     Physical Exam  Constitutional:       General: She is not in acute distress.     Appearance: Normal appearance. She is not ill-appearing.   HENT:      Head: Normocephalic and atraumatic.      Right Ear: External ear normal. There is impacted cerumen.      Left Ear: Tympanic membrane, ear canal and external ear normal. There is no impacted cerumen.      " Nose: Nose normal. No congestion.      Mouth/Throat:      Mouth: Mucous membranes are moist.      Pharynx: No oropharyngeal exudate or posterior oropharyngeal erythema.   Eyes:      Conjunctiva/sclera: Conjunctivae normal.   Neck:      Thyroid: No thyroid mass or thyromegaly.      Vascular: No carotid bruit.   Cardiovascular:      Rate and Rhythm: Normal rate and regular rhythm.      Heart sounds: Normal heart sounds. No murmur heard.  Pulmonary:      Breath sounds: Normal breath sounds. No wheezing, rhonchi or rales.   Abdominal:      General: Bowel sounds are normal. There is no distension.      Palpations: Abdomen is soft. There is no mass.      Tenderness: There is no abdominal tenderness.   Musculoskeletal:         General: No swelling or deformity.      Cervical back: Neck supple. No tenderness.   Lymphadenopathy:      Cervical: No cervical adenopathy.   Skin:     General: Skin is warm and dry.      Findings: No lesion or rash.   Neurological:      Mental Status: She is alert and oriented to person, place, and time.      Sensory: No sensory deficit.      Motor: No weakness.      Coordination: Coordination normal.      Deep Tendon Reflexes: Reflexes normal.   Psychiatric:         Mood and Affect: Mood normal.         Behavior: Behavior normal.         Judgment: Judgment normal.         Assessment/Plan   Diagnoses and all orders for this visit:  Healthcare maintenance - Pneumonia and Shingles vaccines recommended. Other vaccines current. Labs reviewed and discussed. Womens health current per Gyn. Ordering CAC score and DEXA.  Mixed hyperlipidemia/IFG / Weight - recommend low carb diet, increasing water intake to at least 64oz/day, healthy snacking between meals, and regular cardiovascular exercise 150mins/week. Goal for weight loss is 1-2# per week. Thyroid nodule  Rectal cancer - monitoring with CT and colonoscopy with Dr Mulligan    Follow up in 1 year for physical

## 2025-08-18 ENCOUNTER — LAB (OUTPATIENT)
Dept: LAB | Facility: HOSPITAL | Age: 65
End: 2025-08-18
Payer: COMMERCIAL

## 2025-08-18 DIAGNOSIS — C20 MALIGNANT NEOPLASM OF RECTUM (MULTI): Primary | ICD-10-CM

## 2025-08-18 DIAGNOSIS — C20 RECTAL CANCER (MULTI): ICD-10-CM

## 2025-08-18 LAB
ALBUMIN SERPL BCP-MCNC: 4.2 G/DL (ref 3.4–5)
ALP SERPL-CCNC: 67 U/L (ref 33–136)
ALT SERPL W P-5'-P-CCNC: 16 U/L (ref 7–45)
ANION GAP SERPL CALC-SCNC: 12 MMOL/L (ref 10–20)
AST SERPL W P-5'-P-CCNC: 15 U/L (ref 9–39)
BASOPHILS # BLD AUTO: 0.05 X10*3/UL (ref 0–0.1)
BASOPHILS NFR BLD AUTO: 0.7 %
BILIRUB SERPL-MCNC: 0.5 MG/DL (ref 0–1.2)
BUN SERPL-MCNC: 20 MG/DL (ref 6–23)
CALCIUM SERPL-MCNC: 9.3 MG/DL (ref 8.6–10.6)
CEA SERPL-MCNC: 2.1 UG/L
CHLORIDE SERPL-SCNC: 106 MMOL/L (ref 98–107)
CO2 SERPL-SCNC: 28 MMOL/L (ref 21–32)
CREAT SERPL-MCNC: 0.8 MG/DL (ref 0.5–1.05)
EGFRCR SERPLBLD CKD-EPI 2021: 82 ML/MIN/1.73M*2
EOSINOPHIL # BLD AUTO: 0.04 X10*3/UL (ref 0–0.7)
EOSINOPHIL NFR BLD AUTO: 0.5 %
ERYTHROCYTE [DISTWIDTH] IN BLOOD BY AUTOMATED COUNT: 12.9 % (ref 11.5–14.5)
GLUCOSE SERPL-MCNC: 116 MG/DL (ref 74–99)
HCT VFR BLD AUTO: 43.8 % (ref 36–46)
HGB BLD-MCNC: 13.7 G/DL (ref 12–16)
IMM GRANULOCYTES # BLD AUTO: 0.03 X10*3/UL (ref 0–0.7)
IMM GRANULOCYTES NFR BLD AUTO: 0.4 % (ref 0–0.9)
LYMPHOCYTES # BLD AUTO: 1.59 X10*3/UL (ref 1.2–4.8)
LYMPHOCYTES NFR BLD AUTO: 21.1 %
MCH RBC QN AUTO: 29.9 PG (ref 26–34)
MCHC RBC AUTO-ENTMCNC: 31.3 G/DL (ref 32–36)
MCV RBC AUTO: 96 FL (ref 80–100)
MONOCYTES # BLD AUTO: 0.49 X10*3/UL (ref 0.1–1)
MONOCYTES NFR BLD AUTO: 6.5 %
NEUTROPHILS # BLD AUTO: 5.33 X10*3/UL (ref 1.2–7.7)
NEUTROPHILS NFR BLD AUTO: 70.8 %
NRBC BLD-RTO: 0 /100 WBCS (ref 0–0)
PLATELET # BLD AUTO: 245 X10*3/UL (ref 150–450)
POTASSIUM SERPL-SCNC: 4.4 MMOL/L (ref 3.5–5.3)
PROT SERPL-MCNC: 6.8 G/DL (ref 6.4–8.2)
RBC # BLD AUTO: 4.58 X10*6/UL (ref 4–5.2)
SODIUM SERPL-SCNC: 142 MMOL/L (ref 136–145)
WBC # BLD AUTO: 7.5 X10*3/UL (ref 4.4–11.3)

## 2025-08-18 PROCEDURE — 82378 CARCINOEMBRYONIC ANTIGEN: CPT

## 2025-08-18 PROCEDURE — 85025 COMPLETE CBC W/AUTO DIFF WBC: CPT

## 2025-08-18 PROCEDURE — 80053 COMPREHEN METABOLIC PANEL: CPT

## 2025-08-18 PROCEDURE — 36415 COLL VENOUS BLD VENIPUNCTURE: CPT

## 2025-08-20 ENCOUNTER — HOSPITAL ENCOUNTER (OUTPATIENT)
Dept: RADIOLOGY | Facility: CLINIC | Age: 65
Discharge: HOME | End: 2025-08-20
Payer: COMMERCIAL

## 2025-08-20 DIAGNOSIS — C20 RECTAL CANCER (MULTI): ICD-10-CM

## 2025-08-20 PROCEDURE — 71260 CT THORAX DX C+: CPT | Performed by: STUDENT IN AN ORGANIZED HEALTH CARE EDUCATION/TRAINING PROGRAM

## 2025-08-20 PROCEDURE — 74177 CT ABD & PELVIS W/CONTRAST: CPT

## 2025-08-20 PROCEDURE — 74177 CT ABD & PELVIS W/CONTRAST: CPT | Performed by: STUDENT IN AN ORGANIZED HEALTH CARE EDUCATION/TRAINING PROGRAM

## 2025-08-20 PROCEDURE — 2550000001 HC RX 255 CONTRASTS: Performed by: NURSE PRACTITIONER

## 2025-08-20 RX ADMIN — IOHEXOL 69 ML: 350 INJECTION, SOLUTION INTRAVENOUS at 15:46

## 2025-08-26 ENCOUNTER — OFFICE VISIT (OUTPATIENT)
Dept: HEMATOLOGY/ONCOLOGY | Facility: CLINIC | Age: 65
End: 2025-08-26
Payer: COMMERCIAL

## 2025-08-26 VITALS
HEART RATE: 63 BPM | DIASTOLIC BLOOD PRESSURE: 85 MMHG | SYSTOLIC BLOOD PRESSURE: 128 MMHG | OXYGEN SATURATION: 96 % | TEMPERATURE: 97.5 F | WEIGHT: 187.1 LBS | BODY MASS INDEX: 32.12 KG/M2

## 2025-08-26 DIAGNOSIS — C20 RECTAL CANCER (MULTI): ICD-10-CM

## 2025-08-26 PROCEDURE — 99213 OFFICE O/P EST LOW 20 MIN: CPT | Performed by: INTERNAL MEDICINE

## 2025-08-26 ASSESSMENT — PAIN SCALES - GENERAL: PAINLEVEL_OUTOF10: 0-NO PAIN

## 2026-06-29 ENCOUNTER — APPOINTMENT (OUTPATIENT)
Dept: PRIMARY CARE | Facility: CLINIC | Age: 66
End: 2026-06-29
Payer: COMMERCIAL

## (undated) DEVICE — ELECTRODE, ELECTROSURGICAL, BLADE, INSULATED, ENT/IMA, STERILE

## (undated) DEVICE — COVER, CART, 45 X 27 X 48 IN, CLEAR

## (undated) DEVICE — SYRINGE, LUER LOCK, 12ML

## (undated) DEVICE — SUTURE, MONOCRYLIC, 4-0, P3, MONO 18

## (undated) DEVICE — DRESSING, NON-ADHERENT, TELFA, OUCHLESS, 3 X 8 IN, STERILE

## (undated) DEVICE — SLEEVE, SURGICAL, 21.5 X 5.5 IN, LF, STERILE

## (undated) DEVICE — CLIP, LIGATING, W/ADHESIVE PAD, MEDIUM, TITANIUM

## (undated) DEVICE — NEEDLE, HYPODERMIC, REGULAR WALL, REGULAR BEVEL, 22 G X 1 IN

## (undated) DEVICE — COVER, BACK TABLE, 65 X 90, HVY REINFORCED

## (undated) DEVICE — GLOVE, SURGICAL, PROTEXIS MICRO, 7.5, PF, LATEX

## (undated) DEVICE — DISSECTOR, EXACT, LIGASURE

## (undated) DEVICE — DRESSING, ADHESIVE, ISLAND, TELFA, 2 X 3.75 IN, LF

## (undated) DEVICE — COUNTER, NEEDLE, FOAM BLOCK, W/MAGNET, W/BLADE GUARD, 10 COUNT, RED, LF

## (undated) DEVICE — CLIP, LIGATING, W/ADHESIVE, WIDE SLOT, SMALL, TITANIUM

## (undated) DEVICE — STRIP, SKIN CLOSURE, STERI STRIP, REINFORCED, 0.5 X 4 IN

## (undated) DEVICE — DRAPE, INSTRUMENT, W/POUCH, STERI DRAPE, 7 X 11 IN, DISPOSABLE, STERILE

## (undated) DEVICE — SUTURE, VICRYL, 3-0, 54 IN, CTD, UNDYED

## (undated) DEVICE — APPLIER,  LIGACLIP MULTI CLIP, 30 MED 11 1/2

## (undated) DEVICE — SUTURE, PROLENE, 5-0, 36 IN, C-1, CV-11, BLUE

## (undated) DEVICE — APPLICATOR, PREP, CHLORAPREP, W/ORANGE TINT, 10.5ML

## (undated) DEVICE — SUTURE, SILK, 2-0, 18 IN, BLACK

## (undated) DEVICE — TUBE, BLOOD, VACUETTE, K2EDTA, LAVENDER W/BLK RING, 4ML, POLYETHYLENE, PULL CAP

## (undated) DEVICE — SUTURE, SILK, 3-0, 18 IN, MULTIPACK, BLACK

## (undated) DEVICE — SUTURE, VICRYL, 3-0, 27 IN, SH